# Patient Record
Sex: FEMALE | Race: BLACK OR AFRICAN AMERICAN
[De-identification: names, ages, dates, MRNs, and addresses within clinical notes are randomized per-mention and may not be internally consistent; named-entity substitution may affect disease eponyms.]

---

## 2017-01-18 ENCOUNTER — HOSPITAL ENCOUNTER (INPATIENT)
Dept: HOSPITAL 74 - YASAS | Age: 52
LOS: 4 days | Discharge: HOME | DRG: 773 | End: 2017-01-22
Attending: INTERNAL MEDICINE | Admitting: INTERNAL MEDICINE
Payer: COMMERCIAL

## 2017-01-18 VITALS — BODY MASS INDEX: 27.6 KG/M2

## 2017-01-18 DIAGNOSIS — F17.210: ICD-10-CM

## 2017-01-18 DIAGNOSIS — F14.20: ICD-10-CM

## 2017-01-18 DIAGNOSIS — K59.01: ICD-10-CM

## 2017-01-18 DIAGNOSIS — F11.20: ICD-10-CM

## 2017-01-18 DIAGNOSIS — R82.90: ICD-10-CM

## 2017-01-18 DIAGNOSIS — Z91.5: ICD-10-CM

## 2017-01-18 DIAGNOSIS — Z87.42: ICD-10-CM

## 2017-01-18 DIAGNOSIS — F19.24: ICD-10-CM

## 2017-01-18 DIAGNOSIS — E78.5: ICD-10-CM

## 2017-01-18 DIAGNOSIS — I10: ICD-10-CM

## 2017-01-18 DIAGNOSIS — F10.230: Primary | ICD-10-CM

## 2017-01-18 DIAGNOSIS — M17.11: ICD-10-CM

## 2017-01-18 DIAGNOSIS — K21.9: ICD-10-CM

## 2017-01-18 DIAGNOSIS — F31.81: ICD-10-CM

## 2017-01-18 DIAGNOSIS — J45.20: ICD-10-CM

## 2017-01-18 PROCEDURE — HZ2ZZZZ DETOXIFICATION SERVICES FOR SUBSTANCE ABUSE TREATMENT: ICD-10-PCS | Performed by: INTERNAL MEDICINE

## 2017-01-18 RX ADMIN — Medication SCH MG: at 22:07

## 2017-01-18 RX ADMIN — ATORVASTATIN CALCIUM SCH MG: 10 TABLET, FILM COATED ORAL at 22:07

## 2017-01-18 RX ADMIN — CYCLOBENZAPRINE HYDROCHLORIDE PRN MG: 10 TABLET, FILM COATED ORAL at 22:07

## 2017-01-18 RX ADMIN — RANITIDINE SCH MG: 150 TABLET ORAL at 22:07

## 2017-01-19 LAB
ALBUMIN SERPL-MCNC: 4.2 G/DL (ref 3.4–5)
ALP SERPL-CCNC: 121 U/L (ref 45–117)
ALT SERPL-CCNC: 26 U/L (ref 12–78)
ANION GAP SERPL CALC-SCNC: 8 MMOL/L (ref 8–16)
APPEARANCE UR: CLEAR
AST SERPL-CCNC: 24 U/L (ref 15–37)
BILIRUB SERPL-MCNC: 0.5 MG/DL (ref 0.2–1)
BILIRUB UR STRIP.AUTO-MCNC: NEGATIVE MG/DL
CALCIUM SERPL-MCNC: 9.6 MG/DL (ref 8.5–10.1)
CO2 SERPL-SCNC: 28 MMOL/L (ref 21–32)
COLOR UR: YELLOW
CREAT SERPL-MCNC: 1.1 MG/DL (ref 0.55–1.02)
DEPRECATED RDW RBC AUTO: 16.4 % (ref 11.6–15.6)
GLUCOSE SERPL-MCNC: 96 MG/DL (ref 74–106)
HYALINE CASTS URNS QL MICRO: 41 /LPF
KETONES UR QL STRIP: NEGATIVE
LEUKOCYTE ESTERASE UR QL STRIP.AUTO: (no result)
MCH RBC QN AUTO: 26.2 PG (ref 25.7–33.7)
MCHC RBC AUTO-ENTMCNC: 32.3 G/DL (ref 32–36)
MCV RBC: 81 FL (ref 80–96)
MUCOUS THREADS URNS QL MICRO: (no result)
NITRITE UR QL STRIP: NEGATIVE
PH UR: 5 [PH] (ref 5–8)
PLATELET # BLD AUTO: 336 K/MM3 (ref 134–434)
PMV BLD: 8.9 FL (ref 7.5–11.1)
PROT SERPL-MCNC: 8.8 G/DL (ref 6.4–8.2)
PROT UR QL STRIP: NEGATIVE
PROT UR QL STRIP: NEGATIVE
RBC # BLD AUTO: <1 /HPF (ref 0–3)
RBC # UR STRIP: NEGATIVE /UL
SP GR UR: 1.02 (ref 1–1.03)
UROBILINOGEN UR STRIP-MCNC: (no result) E.U./DL (ref 0.2–1)
WBC # BLD AUTO: 7.1 K/MM3 (ref 4–10)
WBC # UR AUTO: 1 /HPF (ref 3–5)

## 2017-01-19 RX ADMIN — RANITIDINE SCH MG: 150 TABLET ORAL at 10:20

## 2017-01-19 RX ADMIN — ANALGESIC BALM SCH: 1.74; 4.06 OINTMENT TOPICAL at 22:23

## 2017-01-19 RX ADMIN — Medication SCH TAB: at 10:16

## 2017-01-19 RX ADMIN — CYCLOBENZAPRINE HYDROCHLORIDE PRN MG: 10 TABLET, FILM COATED ORAL at 22:23

## 2017-01-19 RX ADMIN — Medication SCH MG: at 22:24

## 2017-01-19 RX ADMIN — ANALGESIC BALM SCH APPLIC: 1.74; 4.06 OINTMENT TOPICAL at 10:19

## 2017-01-19 RX ADMIN — ACETAMINOPHEN PRN MG: 325 TABLET ORAL at 05:49

## 2017-01-19 RX ADMIN — RANITIDINE SCH MG: 150 TABLET ORAL at 22:24

## 2017-01-19 RX ADMIN — ATORVASTATIN CALCIUM SCH MG: 10 TABLET, FILM COATED ORAL at 22:23

## 2017-01-19 RX ADMIN — NICOTINE SCH: 14 PATCH, EXTENDED RELEASE TRANSDERMAL at 10:21

## 2017-01-19 RX ADMIN — NICOTINE POLACRILEX PRN MG: 2 GUM, CHEWING ORAL at 10:21

## 2017-01-19 RX ADMIN — ASPIRIN 81 MG SCH MG: 81 TABLET ORAL at 10:15

## 2017-01-19 NOTE — PN
S CIWA





- CIWA Score


Nausea/Vomiting: 3


Muscle Tremors: 3


Anxiety: 3


Agitation: 3


Paroxysmal Sweats: 3


Orientation: 0-Oriented


Tacttile Disturbances: 2-Mild Itch/Numbness/Burn


Auditory Disturbances: 0-None


Visual Disturbances: 0-None


Headache: 0-None Present


CIWA-Ar Total Score: 17





BHS Progress Note (SOAP)


Subjective: 


interrupted sleep, sweats , rt knee arthritis, 


Objective: 





01/19/17 11:01


 Vital Signs











Temperature  98.4 F   01/19/17 10:06


 


Pulse Rate  105 H  01/19/17 10:06


 


Respiratory Rate  20   01/19/17 10:06


 


Blood Pressure  121/67   01/19/17 10:06


 


O2 Sat by Pulse Oximetry (%)      








 Laboratory Tests











  01/19/17 01/19/17





  05:45 05:45


 


WBC  7.1  D 


 


RBC  4.26 


 


Hgb  11.1 


 


Hct  34.5 


 


MCV  81.0 


 


MCHC  32.3 


 


RDW  16.4 H 


 


Plt Count  336  D 


 


MPV  8.9 


 


Sodium   140


 


Potassium   3.8


 


Chloride   104


 


Carbon Dioxide   28


 


Anion Gap   8


 


BUN   20 H D


 


Creatinine   1.1 H


 


Creat Clearance w eGFR   52.16


 


Random Glucose   96


 


Calcium   9.6


 


Total Bilirubin   0.5  D


 


AST   24  D


 


ALT   26


 


Alkaline Phosphatase   121 H D


 


Total Protein   8.8 H D


 


Albumin   4.2








pt aox3 ambulating with a cane 


rt knee arthritis 


Assessment: 





01/19/17 11:02


withdrawl sx's 


rt knee arthritis 


Plan: 


cont. detox 


increase fluids


analgesic balm

## 2017-01-19 NOTE — CONSULT
[facility] Psychiatric Consult





- Data


Date of interview: 01/19/17


Admission source: [facility]


Identifying data: This is 51 years old female, ambulating with cane,  with 

psychiatric hospitalization history iontoxicated with :  Alcohol, Cocaine, 

Xanax and Nicotine


Substance Abuse History: - Smoking Cessation.  Smoking history: Current every 

day smoker.  Have you smoked in the past 12 months: Yes.  Aproximately how many 

cigarettes per day: 10.  Cigars Per Day: 0.  Hx Chewing Tobacco Use: No.  

Initiated information on smoking cessation: Yes.  'Breaking Loose' booklet given

: 01/18/17.  - Substance & Tx. History.  Hx Alcohol Use: Yes.  Hx Substance Use

: Yes.  Substance Use Type: Alcohol, Cocaine, Heroin.  - Substances Abused.  ** 

Alcohol.  Route: Oral.  Frequency: Daily.  Amount used: VODKA- I FULL BOTTLE.  

Age of first use: 48.  Date of Last Use: 01/18/17.  ** Cocaine.  Route: 

Smoking.  Frequency: 1-3 times last 30 days.  Amount used: 3 BAGS- $30.  Age of 

first use: 18.  Date of Last Use: 01/18/17


Medical History: HTN, GERD, Asthma, Hyperlipidemia, Hyper glycemia, Renal 

Insuuficiency, Cellulitis history, right leg pain history


Psychiatric History: Patient reports history of Bipolar disorder with most 

rtecent psychiatric hospitalization on about 8 years ago, currently stable on:  

Seroquel 400mg po bid


Physical/Sexual Abuse/Trauma History: Denies


Additional Comment: Seroquel 400mg po bid





Mental Status Exam





- Mental Status Exam


Alert and Oriented to: Person


Cognitive Function: Fair


Patient Appearance: Unkempt


Mood: Sad


Affect: Mood Congruent


Patient Behavior: Cooperative


Speech Pattern: Appropriate


Voice Loudness: Mildly Loud


Thought Process: Goal Oriented


Thought Disorder: Being Controlled


Hallucinations: Denies


Suicidal Ideation: Denies


Homicidal Ideation: Denies


Insight/Judgement: Fair


Sleep: Difficulty falling asleep


Appetite: Weight gain


Muscle strength/Tone: Mild Hypotonicity


Gait/Station: Ataxic


Additional Comments: Seroquel 400mg po bid





Psychiatric Findings





- Problem List (Axis 1, 2,3)


(1) Alcohol dependence with uncomplicated withdrawal


Current Visit: Yes   Status: Acute





(2) Methadone maintenance therapy patient


Current Visit: Yes   Status: Acute


Comment: METHADONE 170 MG PO DAILY WAIT FOR VERIFICATION








(3) Nicotine dependence


Current Visit: Yes   Status: Acute   Qualifiers: 


     Nicotine product type: cigarettes     Substance use status: uncomplicated 

    Qualified Code(s): F17.210 - Nicotine dependence, cigarettes, uncomplicated

  





(4) Bipolar II disorder


Current Visit: Yes   Status: Suspected





(5) Sedative hypnotic or anxiolytic dependence


Current Visit: No   Status: Acute





(6) Cocaine dependence


Current Visit: No   Status: Chronic   Qualifiers: 


     Substance use status: uncomplicated     Qualified Code(s): F14.20 - 

Cocaine dependence, uncomplicated  





(7) Swelling of knee joint, right


Current Visit: No   Status: Chronic


Comment: cane to ambulate








(8) Drug-induced mood disorder


Current Visit: Yes   Status: Acute








- Initial Treatment Plan


Initial Treatment Plan: Seroquel 200mg po bid

## 2017-01-20 RX ADMIN — ANALGESIC BALM SCH APPLIC: 1.74; 4.06 OINTMENT TOPICAL at 22:10

## 2017-01-20 RX ADMIN — ATORVASTATIN CALCIUM SCH MG: 10 TABLET, FILM COATED ORAL at 22:11

## 2017-01-20 RX ADMIN — RANITIDINE SCH MG: 150 TABLET ORAL at 11:47

## 2017-01-20 RX ADMIN — ASPIRIN 81 MG SCH MG: 81 TABLET ORAL at 09:07

## 2017-01-20 RX ADMIN — QUETIAPINE FUMARATE SCH MG: 100 TABLET ORAL at 22:11

## 2017-01-20 RX ADMIN — Medication SCH MG: at 22:11

## 2017-01-20 RX ADMIN — Medication SCH TAB: at 09:07

## 2017-01-20 RX ADMIN — METHADONE HYDROCHLORIDE SCH MG: 40 TABLET ORAL at 09:04

## 2017-01-20 RX ADMIN — ANALGESIC BALM SCH APPLIC: 1.74; 4.06 OINTMENT TOPICAL at 09:10

## 2017-01-20 RX ADMIN — NICOTINE SCH: 14 PATCH, EXTENDED RELEASE TRANSDERMAL at 09:10

## 2017-01-20 RX ADMIN — RANITIDINE SCH MG: 150 TABLET ORAL at 22:11

## 2017-01-20 NOTE — PN
Psychiatric Progress Note


Vital Signs: 


 Vital Signs











 Period  Temp  Pulse  Resp  BP Sys/Trejo  Pulse Ox


 


 Last 24 Hr  95.2 F-99.9 F    16-20  105-120/71-86  











Date of Session: 01/20/17


Chief Complaint:: Tim drowsy after taking Seroquel and Methadone.


HPI: Patient addressed Alcohol,Cocaine and Anxiolytic dependence comorbid with 

Substance induced mood disorder.


ROS: Multiple medical proplems.See medical history.


Current Medications: 


Active Medications











Generic Name Dose Route Start Last Admin





  Trade Name Freq  PRN Reason Stop Dose Admin


 


Acetaminophen  650 mg 01/18/17 17:01 01/19/17 05:49





  Tylenol -  PO   650 mg





  Q4H PRN   Administration





  FEVER OR PAIN   


 


Al Hydroxide/Mg Hydroxide  30 ml 01/18/17 17:01  





  Mylanta Oral Suspension -  PO   





  Q6H PRN   





  DYSPEPSIA   


 


Aspirin  81 mg 01/19/17 10:00 01/20/17 09:07





  Asa -  PO   81 mg





  DAILY ELLE   Administration


 


Atorvastatin Calcium  10 mg 01/18/17 22:00 01/19/17 22:23





  Lipitor -  PO   10 mg





  HS ELLE   Administration


 


Chlordiazepoxide HCl  10 mg 01/21/17 23:00  





  Librium -  PO 01/22/17 17:01  





  [id number] ELLE   


 


Chlordiazepoxide HCl  25 mg 01/18/17 17:01  





  Librium -  PO 01/21/17 17:00  





  Q4H PRN   





  WITHDRAWAL(CONT SUBST)   


 


Chlordiazepoxide HCl  25 mg 01/19/17 23:00 01/20/17 11:22





  Librium -  PO 01/20/17 17:01  Not Given





  [id number] ELLE   


 


Chlordiazepoxide HCl  15 mg 01/20/17 23:00  





  Librium -  PO 01/21/17 17:01  





  [id number] ELLE   


 


Cyclobenzaprine HCl  10 mg 01/18/17 17:04 01/19/17 22:23





  Flexeril -  PO   10 mg





  TID PRN   Administration





  MUSCLE SPASMS   


 


Diphenhydramine HCl  50 mg 01/18/17 17:01  





  Benadryl -  PO   





  HSMR1 PRN   





  INSOMNIA   


 


Eucalyptus/Menthol/Phenol/Sorbitol  1 each 01/18/17 17:01  





  Cepastat Lozenge -  MM   





  Q4H PRN   





  SORE THROAT   


 


Guaifenesin  10 ml 01/18/17 17:01  





  Robitussin Dm -  PO   





  Q6H PRN   





  COUGH   


 


Loperamide HCl  4 mg 01/18/17 17:01  





  Imodium -  PO   





  Q6H PRN   





  DIARRHEA   


 


Magnesium Citrate  300 ml 01/18/17 17:01  





  Citroma -  PO   





  Q48H PRN   





  CONSTIPATION   


 


Magnesium Hydroxide  30 ml 01/18/17 17:01  





  Milk Of Magnesia -  PO   





  DAILY PRN   





  CONSTIPATION   


 


Methadone HCl 160 mg/  170 mg 01/20/17 06:00 01/20/17 09:04





  Methadone HCl 10 mg  PO 01/26/17 05:59  170 mg





  DAILY@0600 ELLE   Administration


 


Methyl Salicylate  1 applic 01/19/17 10:00 01/20/17 09:10





  Mian-Jolley -  TP   1 applic





  BID ELLE   Administration


 


Nicotine  14 mg 01/19/17 10:00 01/20/17 09:10





  Nicoderm Patch -  TD   Not Given





  DAILY ELLE   


 


Nicotine Polacrilex  2 mg 01/18/17 17:01 01/19/17 10:21





  Nicorette Gum -  BC   2 mg





  Q2H PRN   Administration





  NICOTINE REPLACEMENT RX   


 


Prenatal Multivit/Folic Acid/Iron  1 tab 01/19/17 10:00 01/20/17 09:07





  Prenatal Vitamins (Sjr) -  PO   1 tab





  DAILY ELLE   Administration


 


Pseudoephedrine/Triprolidine  1 combo 01/18/17 17:01  





  Actifed -  PO   





  TID PRN   





  NASAL CONGESTION   


 


Quetiapine Fumarate  100 mg 01/20/17 22:00  





  Seroquel -  PO   





  BID ELLE   


 


Ranitidine HCl  150 mg 01/18/17 22:00 01/19/17 22:24





  Zantac -  PO   150 mg





  BID ELLE   Administration


 


Thiamine HCl  100 mg 01/18/17 22:00 01/19/17 22:24





  Vitamin B1 -  PO   100 mg





  HS ELLE   Administration











Current Side Effect: No


Lab tests ordered: No


Lab tests reviewed: Yes


Provider note:: Patient was reevaluated today.According to the Nursing staff 

and the patient she was sedated and drowsy  since last night(Seroquel 200 mg po 

bid and Methadone 170 mg po daily was ordered yesterday).She is alert and 

oriented now.Properties of Methadone and Seroquel has been discussed with the 

patient including side effects ,benefits and dose adjustement.  Seroquel 200 mg 

po bid will be adjusted to 100 mg po bid.  Will monitor progress.





Mental Status Exam





- Mental Status Exam


Alert and Oriented to: Time, Place, Person


Cognitive Function: Grossly Intact


Patient Appearance: Unkempt


Mood: Euthymic


Affect: Mood Congruent


Patient Behavior: Sedated


Speech Pattern: Clear


Voice Loudness: Normal


Thought Process: Goal Oriented


Thought Disorder: Not Present


Hallucinations: Denies


Suicidal Ideation: Denies


Homicidal Ideation: Denies


Insight/Judgement: Fair


Sleep: Fair


Appetite: Fair


Muscle strength/Tone: Normal


Gait/Station: Normal





Psychiatric Treatment Plan





- Problem List


(1) Alcohol dependence with uncomplicated withdrawal


Current Visit: Yes





(2) Asthma


Current Visit: Yes   Qualifiers: 


     Asthma severity: mild intermittent     Asthma complication type: 

uncomplicated        Qualified Code(s): J45.20 - Mild intermittent asthma, 

uncomplicated  


Comment: ALBUTEROL


STRONG RECOMMEND STOP SMOKE CIGARETTE








(3) Drug-induced mood disorder


Current Visit: Yes





(4) GERD (gastroesophageal reflux disease)


Current Visit: Yes   Qualifiers: 


     Esophagitis presence: without esophagitis        Qualified Code(s): K21.9 

- Gastro-esophageal reflux disease without esophagitis  





(5) HTN (hypertension)


Current Visit: Yes   Qualifiers: 


     Hypertension type: essential hypertension     Qualified Code(s): I10 - 

Essential (primary) hypertension  





(6) Hyperlipidemia


Current Visit: Yes   Qualifiers: 


     Hyperlipidemia type: pure hypercholesterolemia        Qualified Code(s): 

E78.0 - Pure hypercholesterolemia  





(7) Methadone maintenance therapy patient


Current Visit: Yes


Comment: METHADONE 170 MG PO DAILY WAIT FOR VERIFICATION

## 2017-01-20 NOTE — PN
S CIWA





- CIWA Score


Nausea/Vomitin-No Nausea/No Vomiting


Muscle Tremors: 4-Moderate,w/Arms Extend


Anxiety: 3


Agitation: 4-Moderately Restless


Paroxysmal Sweats: 3


Orientation: 0-Oriented


Tacttile Disturbances: 0-None


Auditory Disturbances: 0-None


Visual Disturbances: 0-None


Headache: 0-None Present


CIWA-Ar Total Score: 14





BHS Progress Note (SOAP)


Subjective: 


body aches


sweats


interrupted sleep


Objective: 





17 11:37


 Vital Signs











Temperature  95.2 F L  17 10:00


 


Pulse Rate  106 H  17 10:00


 


Respiratory Rate  20   17 10:00


 


Blood Pressure  105/71   17 10:00


 


O2 Sat by Pulse Oximetry (%)      








 Laboratory Tests











  17





  05:45 05:45 05:45


 


WBC  7.1  D  


 


RBC  4.26  


 


Hgb  11.1  


 


Hct  34.5  


 


MCV  81.0  


 


MCHC  32.3  


 


RDW  16.4 H  


 


Plt Count  336  D  


 


MPV  8.9  


 


Sodium   140 


 


Potassium   3.8 


 


Chloride   104 


 


Carbon Dioxide   28 


 


Anion Gap   8 


 


BUN   20 H D 


 


Creatinine   1.1 H 


 


Creat Clearance w eGFR   52.16 


 


Random Glucose   96 


 


Calcium   9.6 


 


Total Bilirubin   0.5  D 


 


AST   24  D 


 


ALT   26 


 


Alkaline Phosphatase   121 H D 


 


Total Protein   8.8 H D 


 


Albumin   4.2 


 


Urine Color   


 


Urine Appearance   


 


Urine pH   


 


Ur Specific Gravity   


 


Urine Protein   


 


Urine Glucose (UA)   


 


Urine Ketones   


 


Urine Blood   


 


Urine Nitrite   


 


Urine Bilirubin   


 


Urine Urobilinogen   


 


Ur Leukocyte Esterase   


 


Urine RBC   


 


Urine WBC   


 


Ur Epithelial Cells   


 


Hyaline Casts   


 


Urine Mucus   


 


RPR Titer    Nonreactive














  17





  11:15


 


WBC 


 


RBC 


 


Hgb 


 


Hct 


 


MCV 


 


MCHC 


 


RDW 


 


Plt Count 


 


MPV 


 


Sodium 


 


Potassium 


 


Chloride 


 


Carbon Dioxide 


 


Anion Gap 


 


BUN 


 


Creatinine 


 


Creat Clearance w eGFR 


 


Random Glucose 


 


Calcium 


 


Total Bilirubin 


 


AST 


 


ALT 


 


Alkaline Phosphatase 


 


Total Protein 


 


Albumin 


 


Urine Color  Yellow


 


Urine Appearance  Clear


 


Urine pH  5.0


 


Ur Specific Gravity  1.020


 


Urine Protein  Negative


 


Urine Glucose (UA)  Negative


 


Urine Ketones  Negative


 


Urine Blood  Negative


 


Urine Nitrite  Negative


 


Urine Bilirubin  Negative


 


Urine Urobilinogen  4.0 e.u/dl H


 


Ur Leukocyte Esterase  2+ H


 


Urine RBC  <1


 


Urine WBC  1


 


Ur Epithelial Cells  Rare


 


Hyaline Casts  41


 


Urine Mucus  Rare


 


RPR Titer 








awake/alert


ambulating


no acute distress


Assessment: 





17 11:37


withdrawal sx


Plan: 


continue detox


increase fluids


motrin/tylenol prn

## 2017-01-21 RX ADMIN — RANITIDINE SCH MG: 150 TABLET ORAL at 10:43

## 2017-01-21 RX ADMIN — Medication SCH MG: at 22:24

## 2017-01-21 RX ADMIN — NICOTINE POLACRILEX PRN MG: 2 GUM, CHEWING ORAL at 10:45

## 2017-01-21 RX ADMIN — NICOTINE SCH MG: 14 PATCH, EXTENDED RELEASE TRANSDERMAL at 10:45

## 2017-01-21 RX ADMIN — ANALGESIC BALM SCH APPLIC: 1.74; 4.06 OINTMENT TOPICAL at 22:26

## 2017-01-21 RX ADMIN — ATORVASTATIN CALCIUM SCH MG: 10 TABLET, FILM COATED ORAL at 22:24

## 2017-01-21 RX ADMIN — ACETAMINOPHEN PRN MG: 325 TABLET ORAL at 17:46

## 2017-01-21 RX ADMIN — METHADONE HYDROCHLORIDE SCH MG: 40 TABLET ORAL at 06:02

## 2017-01-21 RX ADMIN — ANALGESIC BALM SCH APPLIC: 1.74; 4.06 OINTMENT TOPICAL at 10:43

## 2017-01-21 RX ADMIN — Medication SCH TAB: at 10:42

## 2017-01-21 RX ADMIN — BACITRACIN ZINC SCH: 500 OINTMENT TOPICAL at 14:28

## 2017-01-21 RX ADMIN — QUETIAPINE FUMARATE SCH MG: 100 TABLET ORAL at 22:24

## 2017-01-21 RX ADMIN — QUETIAPINE FUMARATE SCH MG: 100 TABLET ORAL at 10:42

## 2017-01-21 RX ADMIN — ASPIRIN 81 MG SCH MG: 81 TABLET ORAL at 10:43

## 2017-01-21 RX ADMIN — BACITRACIN ZINC SCH APPLIC: 500 OINTMENT TOPICAL at 22:26

## 2017-01-21 RX ADMIN — CYCLOBENZAPRINE HYDROCHLORIDE PRN MG: 10 TABLET, FILM COATED ORAL at 22:24

## 2017-01-21 RX ADMIN — ACETAMINOPHEN PRN MG: 325 TABLET ORAL at 10:47

## 2017-01-21 RX ADMIN — RANITIDINE SCH MG: 150 TABLET ORAL at 22:24

## 2017-01-21 RX ADMIN — CYCLOBENZAPRINE HYDROCHLORIDE PRN MG: 10 TABLET, FILM COATED ORAL at 10:47

## 2017-01-21 NOTE — PN
[facility]HS Progress Note (SOAP)


Subjective: 


nausea, sweats, interrupted sleep, need asthma pump, bacitracin for cuts on hand


Objective: 





01/21/17 12:45


 Vital Signs - 8 hr











  01/21/17 01/21/17





  06:31 10:04


 


Temperature 98 F 99 F


 


Pulse Rate 91 H 107 H


 


Respiratory 20 16





Rate  


 


Blood Pressure 111/65 109/89








 Laboratory Tests











  01/19/17 01/19/17 01/19/17





  05:45 05:45 05:45


 


WBC  7.1  D  


 


RBC  4.26  


 


Hgb  11.1  


 


Hct  34.5  


 


MCV  81.0  


 


MCHC  32.3  


 


RDW  16.4 H  


 


Plt Count  336  D  


 


MPV  8.9  


 


Sodium   140 


 


Potassium   3.8 


 


Chloride   104 


 


Carbon Dioxide   28 


 


Anion Gap   8 


 


BUN   20 H D 


 


Creatinine   1.1 H 


 


Creat Clearance w eGFR   52.16 


 


Random Glucose   96 


 


Calcium   9.6 


 


Total Bilirubin   0.5  D 


 


AST   24  D 


 


ALT   26 


 


Alkaline Phosphatase   121 H D 


 


Total Protein   8.8 H D 


 


Albumin   4.2 


 


Urine Color   


 


Urine Appearance   


 


Urine pH   


 


Ur Specific Gravity   


 


Urine Protein   


 


Urine Glucose (UA)   


 


Urine Ketones   


 


Urine Blood   


 


Urine Nitrite   


 


Urine Bilirubin   


 


Urine Urobilinogen   


 


Ur Leukocyte Esterase   


 


Urine RBC   


 


Urine WBC   


 


Ur Epithelial Cells   


 


Hyaline Casts   


 


Urine Mucus   


 


RPR Titer    Nonreactive














  01/19/17





  11:15


 


WBC 


 


RBC 


 


Hgb 


 


Hct 


 


MCV 


 


MCHC 


 


RDW 


 


Plt Count 


 


MPV 


 


Sodium 


 


Potassium 


 


Chloride 


 


Carbon Dioxide 


 


Anion Gap 


 


BUN 


 


Creatinine 


 


Creat Clearance w eGFR 


 


Random Glucose 


 


Calcium 


 


Total Bilirubin 


 


AST 


 


ALT 


 


Alkaline Phosphatase 


 


Total Protein 


 


Albumin 


 


Urine Color  Yellow


 


Urine Appearance  Clear


 


Urine pH  5.0


 


Ur Specific Gravity  1.020


 


Urine Protein  Negative


 


Urine Glucose (UA)  Negative


 


Urine Ketones  Negative


 


Urine Blood  Negative


 


Urine Nitrite  Negative


 


Urine Bilirubin  Negative


 


Urine Urobilinogen  4.0 e.u/dl H


 


Ur Leukocyte Esterase  2+ H


 


Urine RBC  <1


 


Urine WBC  1


 


Ur Epithelial Cells  Rare


 


Hyaline Casts  41


 


Urine Mucus  Rare


 


RPR Titer 








abnormal u/a


Assessment: 





01/21/17 12:45


withdrawal sx, r/o uti needs repeat u/a, athma pump and topical bacitracin for 

cuts


Plan: 


cont detox

## 2017-01-22 VITALS — TEMPERATURE: 98.2 F | SYSTOLIC BLOOD PRESSURE: 117 MMHG | HEART RATE: 97 BPM | DIASTOLIC BLOOD PRESSURE: 77 MMHG

## 2017-01-22 LAB
APPEARANCE UR: CLEAR
BILIRUB UR STRIP.AUTO-MCNC: NEGATIVE MG/DL
COLOR UR: (no result)
KETONES UR QL STRIP: NEGATIVE
LEUKOCYTE ESTERASE UR QL STRIP.AUTO: (no result)
NITRITE UR QL STRIP: NEGATIVE
PH UR: 5 [PH] (ref 5–8)
PROT UR QL STRIP: NEGATIVE
PROT UR QL STRIP: NEGATIVE
RBC # BLD AUTO: (no result) /HPF (ref 0–3)
RBC # UR STRIP: NEGATIVE /UL
SP GR UR: 1.02 (ref 1–1.03)
UROBILINOGEN UR STRIP-MCNC: NEGATIVE E.U./DL (ref 0.2–1)
WBC # UR AUTO: 1 /HPF (ref 3–5)

## 2017-01-22 RX ADMIN — METHADONE HYDROCHLORIDE SCH MG: 40 TABLET ORAL at 06:05

## 2017-01-22 NOTE — PN
[facility]HS Progress Note (SOAP)


Subjective: 


no complaints


Objective: 





01/22/17 08:52


 Vital Signs - 8 hr











  01/22/17 01/22/17





  03:30 06:00


 


Temperature  98.4 F


 


Pulse Rate  91 H


 


Respiratory 18 18





Rate  


 


Blood Pressure  124/67








 Laboratory Tests











  01/19/17 01/19/17 01/19/17





  05:45 05:45 05:45


 


WBC  7.1  D  


 


RBC  4.26  


 


Hgb  11.1  


 


Hct  34.5  


 


MCV  81.0  


 


MCHC  32.3  


 


RDW  16.4 H  


 


Plt Count  336  D  


 


MPV  8.9  


 


Sodium   140 


 


Potassium   3.8 


 


Chloride   104 


 


Carbon Dioxide   28 


 


Anion Gap   8 


 


BUN   20 H D 


 


Creatinine   1.1 H 


 


Creat Clearance w eGFR   52.16 


 


Random Glucose   96 


 


Calcium   9.6 


 


Total Bilirubin   0.5  D 


 


AST   24  D 


 


ALT   26 


 


Alkaline Phosphatase   121 H D 


 


Total Protein   8.8 H D 


 


Albumin   4.2 


 


Urine Color   


 


Urine Appearance   


 


Urine pH   


 


Ur Specific Gravity   


 


Urine Protein   


 


Urine Glucose (UA)   


 


Urine Ketones   


 


Urine Blood   


 


Urine Nitrite   


 


Urine Bilirubin   


 


Urine Urobilinogen   


 


Ur Leukocyte Esterase   


 


Urine RBC   


 


Urine WBC   


 


Ur Epithelial Cells   


 


Hyaline Casts   


 


Urine Mucus   


 


RPR Titer    Nonreactive














  01/19/17





  11:15


 


WBC 


 


RBC 


 


Hgb 


 


Hct 


 


MCV 


 


MCHC 


 


RDW 


 


Plt Count 


 


MPV 


 


Sodium 


 


Potassium 


 


Chloride 


 


Carbon Dioxide 


 


Anion Gap 


 


BUN 


 


Creatinine 


 


Creat Clearance w eGFR 


 


Random Glucose 


 


Calcium 


 


Total Bilirubin 


 


AST 


 


ALT 


 


Alkaline Phosphatase 


 


Total Protein 


 


Albumin 


 


Urine Color  Yellow


 


Urine Appearance  Clear


 


Urine pH  5.0


 


Ur Specific Gravity  1.020


 


Urine Protein  Negative


 


Urine Glucose (UA)  Negative


 


Urine Ketones  Negative


 


Urine Blood  Negative


 


Urine Nitrite  Negative


 


Urine Bilirubin  Negative


 


Urine Urobilinogen  4.0 e.u/dl H


 


Ur Leukocyte Esterase  2+ H


 


Urine RBC  <1


 


Urine WBC  1


 


Ur Epithelial Cells  Rare


 


Hyaline Casts  41


 


Urine Mucus  Rare


 


RPR Titer 











Assessment: 





01/22/17 08:53


completed detox, medically stable, repeat u/a sent results pending


Plan: 


medically stable, d/c home today f/u PCP for abnormal lab results encourage 

fluids.

## 2017-01-22 NOTE — DS
[facility] Detox Discharge Summary


Admission Date: 


01/18/17





Discharge Date: 01/22/17





- History


Present History: Alcohol Dependence, MMTP


Pertinent Past History: 


asthma, constipation, anxiety, depression and insomnia, GERD





- Physical Exam Results


Vital Signs: 


 Vital Signs











Temperature  98.4 F   01/22/17 06:00


 


Pulse Rate  91 H  01/22/17 06:00


 


Respiratory Rate  18   01/22/17 06:00


 


Blood Pressure  124/67   01/22/17 06:00


 


O2 Sat by Pulse Oximetry (%)      











Pertinent Admission Physical Exam Findings: 


withdrawal sx





- Treatment


Hospital Course: Detox Protocol Followed, Detoxed Safely, Responded well, 

Discharged Condition Good, Rehab Referral Accepted


Patient has Accepted a Rehab Referral to: Yes





- Medication


Discharge Medications: 


Ambulatory Orders





Quetiapine Fumarate [Seroquel -] 400 mg PO BID 05/25/16 


Hydroxyzine Pamoate [Vistaril -] 100 mg PO BID #60 capsule 08/10/16 


Aspirin [ASA -] 81 mg PO DAILY #30 tab.chew 08/13/16 


Pravastatin Sodium [Pravachol -] 20 mg PO HS #30 tablet 08/13/16 


Baclofen [Lioresal -] 10 mg PO BID 01/18/17 


Clonidine HCl 0.3 mg PO DAILY 01/18/17 


Omeprazole 20 mg PO DAILY 01/18/17 


Quetiapine Fumarate [Seroquel -] 200 mg PO BID #60 tab 01/19/17 











- Diagnosis


(1) Alcohol dependence with uncomplicated withdrawal


Current Visit: Yes   Status: Acute





(2) Asthma


Current Visit: Yes   Status: Acute   Qualifiers: 


     Asthma severity: mild intermittent     Asthma complication type: 

uncomplicated        Qualified Code(s): J45.20 - Mild intermittent asthma, 

uncomplicated  





(3) Constipation


Current Visit: Yes   Status: Acute   Qualifiers: 


     Constipation type: slow transit constipation        Qualified Code(s): 

K59.01 - Slow transit constipation  





(4) Drug-induced mood disorder


Current Visit: Yes   Status: Acute





(5) GERD (gastroesophageal reflux disease)


Current Visit: Yes   Status: Acute   Qualifiers: 


     Esophagitis presence: without esophagitis        Qualified Code(s): K21.9 

- Gastro-esophageal reflux disease without esophagitis  





(6) HTN (hypertension)


Current Visit: Yes   Status: Acute   Qualifiers: 


     Hypertension type: essential hypertension     Qualified Code(s): I10 - 

Essential (primary) hypertension  





(7) Hyperlipidemia


Current Visit: Yes   Status: Acute   Qualifiers: 


     Hyperlipidemia type: pure hypercholesterolemia        Qualified Code(s): 

E78.0 - Pure hypercholesterolemia  





(8) Methadone maintenance therapy patient


Current Visit: Yes   Status: Acute

## 2017-08-08 ENCOUNTER — HOSPITAL ENCOUNTER (INPATIENT)
Dept: HOSPITAL 74 - YASAS | Age: 52
LOS: 5 days | Discharge: HOME | DRG: 773 | End: 2017-08-13
Attending: INTERNAL MEDICINE | Admitting: INTERNAL MEDICINE
Payer: COMMERCIAL

## 2017-08-08 VITALS — BODY MASS INDEX: 32.5 KG/M2

## 2017-08-08 DIAGNOSIS — J45.20: ICD-10-CM

## 2017-08-08 DIAGNOSIS — B18.2: ICD-10-CM

## 2017-08-08 DIAGNOSIS — R26.2: ICD-10-CM

## 2017-08-08 DIAGNOSIS — F10.230: Primary | ICD-10-CM

## 2017-08-08 DIAGNOSIS — Z87.42: ICD-10-CM

## 2017-08-08 DIAGNOSIS — K21.9: ICD-10-CM

## 2017-08-08 DIAGNOSIS — M79.604: ICD-10-CM

## 2017-08-08 DIAGNOSIS — F19.24: ICD-10-CM

## 2017-08-08 DIAGNOSIS — F17.210: ICD-10-CM

## 2017-08-08 DIAGNOSIS — I10: ICD-10-CM

## 2017-08-08 DIAGNOSIS — E78.5: ICD-10-CM

## 2017-08-08 DIAGNOSIS — Z99.89: ICD-10-CM

## 2017-08-08 DIAGNOSIS — M25.461: ICD-10-CM

## 2017-08-08 DIAGNOSIS — F31.81: ICD-10-CM

## 2017-08-08 DIAGNOSIS — E66.9: ICD-10-CM

## 2017-08-08 DIAGNOSIS — F11.20: ICD-10-CM

## 2017-08-08 DIAGNOSIS — F14.20: ICD-10-CM

## 2017-08-08 DIAGNOSIS — Z91.5: ICD-10-CM

## 2017-08-08 DIAGNOSIS — J44.9: ICD-10-CM

## 2017-08-08 LAB
APPEARANCE UR: (no result)
BILIRUB UR STRIP.AUTO-MCNC: NEGATIVE MG/DL
COLOR UR: YELLOW
KETONES UR QL STRIP: NEGATIVE
LEUKOCYTE ESTERASE UR QL STRIP.AUTO: (no result)
NITRITE UR QL STRIP: NEGATIVE
PH UR: 5 [PH] (ref 5–8)
PROT UR QL STRIP: NEGATIVE
PROT UR QL STRIP: NEGATIVE
RBC # UR STRIP: NEGATIVE /UL
UROBILINOGEN UR STRIP-MCNC: NEGATIVE MG/DL (ref 0.2–1)

## 2017-08-08 PROCEDURE — HZ2ZZZZ DETOXIFICATION SERVICES FOR SUBSTANCE ABUSE TREATMENT: ICD-10-PCS | Performed by: INTERNAL MEDICINE

## 2017-08-08 RX ADMIN — ATORVASTATIN CALCIUM SCH MG: 10 TABLET, FILM COATED ORAL at 22:22

## 2017-08-08 RX ADMIN — Medication SCH MG: at 22:23
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
## 2017-08-09 LAB
ALBUMIN SERPL-MCNC: 3 G/DL (ref 3.4–5)
ALP SERPL-CCNC: 89 U/L (ref 45–117)
ALT SERPL-CCNC: 35 U/L (ref 12–78)
ANION GAP SERPL CALC-SCNC: 7 MMOL/L (ref 8–16)
AST SERPL-CCNC: 34 U/L (ref 15–37)
BILIRUB SERPL-MCNC: 0.2 MG/DL (ref 0.2–1)
CALCIUM SERPL-MCNC: 9.4 MG/DL (ref 8.5–10.1)
CO2 SERPL-SCNC: 28 MMOL/L (ref 21–32)
CREAT SERPL-MCNC: 0.8 MG/DL (ref 0.55–1.02)
DEPRECATED RDW RBC AUTO: 16.3 % (ref 11.6–15.6)
GLUCOSE SERPL-MCNC: 66 MG/DL (ref 74–106)
HYALINE CASTS URNS QL MICRO: 1 /LPF
MCH RBC QN AUTO: 27.6 PG (ref 25.7–33.7)
MCHC RBC AUTO-ENTMCNC: 31.7 G/DL (ref 32–36)
MCV RBC: 87 FL (ref 80–96)
MUCOUS THREADS URNS QL MICRO: (no result)
PLATELET # BLD AUTO: 197 K/MM3 (ref 134–434)
PMV BLD: 9.6 FL (ref 7.5–11.1)
PROT SERPL-MCNC: 6.5 G/DL (ref 6.4–8.2)
RBC # BLD AUTO: 1 /HPF (ref 0–3)
SP GR UR: 1.02 (ref 1–1.02)
WBC # BLD AUTO: 5.6 K/MM3 (ref 4–10)
WBC # UR AUTO: 7 /HPF (ref 3–5)

## 2017-08-09 RX ADMIN — PANTOPRAZOLE SODIUM SCH MG: 40 TABLET, DELAYED RELEASE ORAL at 10:34

## 2017-08-09 RX ADMIN — IBUPROFEN PRN MG: 400 TABLET, FILM COATED ORAL at 20:57

## 2017-08-09 RX ADMIN — ASPIRIN 81 MG SCH MG: 81 TABLET ORAL at 10:34

## 2017-08-09 RX ADMIN — IBUPROFEN PRN MG: 400 TABLET, FILM COATED ORAL at 12:38

## 2017-08-09 RX ADMIN — Medication SCH TAB: at 10:34

## 2017-08-09 RX ADMIN — QUETIAPINE SCH MG: 400 TABLET, FILM COATED ORAL at 22:18

## 2017-08-09 RX ADMIN — ATORVASTATIN CALCIUM SCH MG: 10 TABLET, FILM COATED ORAL at 22:17

## 2017-08-09 RX ADMIN — NICOTINE SCH MG: 21 PATCH TRANSDERMAL at 10:39

## 2017-08-09 RX ADMIN — Medication SCH MG: at 22:18

## 2017-08-09 NOTE — EKG
Test Reason : 

Blood Pressure : ***/*** mmHG

Vent. Rate : 078 BPM     Atrial Rate : 078 BPM

   P-R Int : 136 ms          QRS Dur : 076 ms

    QT Int : 378 ms       P-R-T Axes : 058 056 033 degrees

   QTc Int : 430 ms

 

NORMAL SINUS RHYTHM

NONSPECIFIC T WAVE ABNORMALITY

ABNORMAL ECG

WHEN COMPARED WITH ECG OF 18-JAN-2017 18:53,

QT HAS LENGTHENED

Confirmed by ONEL OSEI MD ([id number]) on 8/9/2017 2:50:37 PM

 

Referred By:             Confirmed By:ONEL OSEI MD

## 2017-08-09 NOTE — PN
S CIWA





- CIWA Score


Nausea/Vomitin-No Nausea/No Vomiting


Muscle Tremors: 4-Moderate,w/Arms Extend


Anxiety: 3


Agitation: 4-Moderately Restless


Paroxysmal Sweats: 3


Orientation: 0-Oriented


Tacttile Disturbances: 0-None


Auditory Disturbances: 0-None


Visual Disturbances: 0-None


Headache: 1-Very Mild


CIWA-Ar Total Score: 15





[facility] Progress Note (SOAP)


Subjective: 


sweats


shakes


interrupted sleep


agitation


Objective: 





17 10:16


 Vital Signs











Temperature  99 F   17 10:03


 


Pulse Rate  91 H  17 10:03


 


Respiratory Rate  18   17 10:03


 


Blood Pressure  162/85   17 10:03


 


O2 Sat by Pulse Oximetry (%)      








 Laboratory Tests











  17





  18:25 07:40


 


WBC   5.6


 


RBC   4.17


 


Hgb   11.5


 


Hct   36.3


 


MCV   87.0


 


MCH   27.6


 


MCHC   31.7 L


 


RDW   16.3 H


 


Plt Count   197  D


 


MPV   9.6


 


Urine Color  Yellow 


 


Urine Appearance  Slcloudy 


 


Urine pH  5.0 


 


Urine Protein  Negative 


 


Urine Glucose (UA)  Negative 


 


Urine Ketones  Negative 


 


Urine Blood  Negative 


 


Urine Nitrite  Negative 


 


Urine Bilirubin  Negative 


 


Urine Urobilinogen  Negative 


 


Ur Leukocyte Esterase  3+ H D 


 


Urine RBC  1 


 


Urine WBC  7 


 


Ur Epithelial Cells  Rare 


 


Hyaline Casts  1 


 


Urine Mucus  Rare 








rest of labs pending


awake/alert


ambulating


no acute distress


Assessment: 





17 10:19


withdrawal sx


Plan: 


continue detox


increase fluids


labs pending

## 2017-08-09 NOTE — CONSULT
[facility] Psychiatric Consult





- Data


Date of interview: 08/09/17


Admission source: [facility]


Identifying data: This is 51 years old female ambulating with Cane, with 

psychiatric hospitalization history intoxicated with: Alcohol, Cocaine, Nicotine


Substance Abuse History: Smoking history: Current every day smoker.  Have you 

smoked in the past 12 months: Yes.  Aproximately how many cigarettes per day: 

10.  Cigars Per Day: 0.  Hx Chewing Tobacco Use: No.  Initiated information on 

smoking cessation: Yes.  'Breaking Loose' booklet given: 08/08/17.  - Substance 

& Tx. History.  Hx Alcohol Use: Yes.  Hx Substance Use: Yes.  Substance Use Type

: Alcohol, Cocaine.  Hx Substance Use Treatment: Yes ([facility] 01/18/17 o 01/22/17)

.  - Substances Abused.  ** Alcohol.  Route: Oral.  Frequency: Daily.  Amount 

used: 1 to 2 pints of vodka.  Age of first use: 11.  Date of Last Use: 08/07/ 17.  ** Cocaine.  Route: Smoking.  Frequency: 1-2 times per week.  Amount used: 

50$.  Age of first use: 18.  Date of Last Use: 08/05/17


Medical History: GERDM Obesity, HTN, Ambulates with cane, HepC+, Cellulitis 

history


Psychiatric History: Patient reprots history of Bipolar Disorder, unclear 

psychiatric admissioin on more tnen 10 years ago, denies suicidal history, 

reports taking prior to admission:  Seroquel 400mg po bid


Physical/Sexual Abuse/Trauma History: Denies


Additional Comment: Seroquel 400mg po bid





Mental Status Exam





- Mental Status Exam


Alert and Oriented to: Person


Cognitive Function: Fair


Patient Appearance: Unkempt


Mood: Sad


Affect: Flat


Patient Behavior: Sedated, Cooperative


Speech Pattern: Delayed


Voice Loudness: Mildly Soft/Quiet


Thought Process: Circumstantial


Thought Disorder: Being Controlled


Hallucinations: Denies


Suicidal Ideation: Denies


Homicidal Ideation: Denies


Insight/Judgement: Fair


Sleep: Difficulty falling asleep


Appetite: Weight gain


Muscle strength/Tone: Mild Hypotonicity


Gait/Station: Deferred


Additional Comments: Seroquel 400mg po bid





Psychiatric Findings





- Problem List (Axis 1, 2,3)


(1) Alcohol dependence with uncomplicated withdrawal


Current Visit: No   Status: Acute





(2) Drug-induced mood disorder


Current Visit: No   Status: Acute





(3) Methadone maintenance therapy patient


Current Visit: No   Status: Acute


Comment: METHADONE 170 MG PO DAILY WAIT FOR VERIFICATION








(4) Sedative hypnotic or anxiolytic dependence


Current Visit: No   Status: Acute





(5) Cocaine dependence


Current Visit: No   Status: Chronic   Qualifiers: 


     Substance use status: uncomplicated     Qualified Code(s): F14.20 - 

Cocaine dependence, uncomplicated  





(6) Bipolar II disorder


Current Visit: No   Status: Suspected








- Initial Treatment Plan


Initial Treatment Plan: Seroquel 400mg po [facility]HS

## 2017-08-10 RX ADMIN — ANALGESIC BALM SCH APPLIC: 1.74; 4.06 OINTMENT TOPICAL at 22:08

## 2017-08-10 RX ADMIN — IBUPROFEN PRN MG: 400 TABLET, FILM COATED ORAL at 22:11

## 2017-08-10 RX ADMIN — Medication SCH MG: at 22:09

## 2017-08-10 RX ADMIN — METHADONE HYDROCHLORIDE SCH MG: 40 TABLET ORAL at 07:22

## 2017-08-10 RX ADMIN — ASPIRIN 81 MG SCH MG: 81 TABLET ORAL at 10:37

## 2017-08-10 RX ADMIN — QUETIAPINE SCH MG: 400 TABLET, FILM COATED ORAL at 22:09

## 2017-08-10 RX ADMIN — NICOTINE SCH MG: 21 PATCH TRANSDERMAL at 10:37

## 2017-08-10 RX ADMIN — PANTOPRAZOLE SODIUM SCH MG: 40 TABLET, DELAYED RELEASE ORAL at 10:38

## 2017-08-10 RX ADMIN — Medication SCH TAB: at 10:37

## 2017-08-10 RX ADMIN — NICOTINE POLACRILEX PRN MG: 2 GUM, CHEWING ORAL at 10:38

## 2017-08-10 RX ADMIN — IBUPROFEN PRN MG: 400 TABLET, FILM COATED ORAL at 05:40

## 2017-08-10 RX ADMIN — ANALGESIC BALM SCH APPLIC: 1.74; 4.06 OINTMENT TOPICAL at 11:02

## 2017-08-10 RX ADMIN — ATORVASTATIN CALCIUM SCH MG: 10 TABLET, FILM COATED ORAL at 22:09

## 2017-08-10 NOTE — PN
[facility] CIWA





- CIWA Score


Nausea/Vomitin-No Nausea/No Vomiting


Muscle Tremors: 3


Anxiety: 2


Agitation: 3


Paroxysmal Sweats: 3


Orientation: 0-Oriented


Tacttile Disturbances: 0-None


Auditory Disturbances: 0-None


Visual Disturbances: 0-None


Headache: 0-None Present


CIWA-Ar Total Score: 11





B[facility] Progress Note (SOAP)


Subjective: 


right knee pain


sweats


interrupted sleep


agitation


Objective: 





08/10/17 09:18


 Vital Signs











Temperature  97.7 F   08/10/17 06:23


 


Pulse Rate  73   08/10/17 06:23


 


Respiratory Rate  18   08/10/17 06:23


 


Blood Pressure  122/58   08/10/17 06:23


 


O2 Sat by Pulse Oximetry (%)      








 Laboratory Tests











  17





  18:25 07:40 07:40


 


WBC   5.6 


 


RBC   4.17 


 


Hgb   11.5 


 


Hct   36.3 


 


MCV   87.0 


 


MCH   27.6 


 


MCHC   31.7 L 


 


RDW   16.3 H 


 


Plt Count   197  D 


 


MPV   9.6 


 


Sodium    139


 


Potassium    4.0


 


Chloride    104


 


Carbon Dioxide    28


 


Anion Gap    7 L


 


BUN    15  D


 


Creatinine    0.8  D


 


Creat Clearance w eGFR    > 60


 


Random Glucose    66 L D


 


Calcium    9.4


 


Total Bilirubin    0.2  D


 


AST    34  D


 


ALT    35  D


 


Alkaline Phosphatase    89  D


 


Total Protein    6.5  D


 


Albumin    3.0 L D


 


Urine Color  Yellow  


 


Urine Appearance  Slcloudy  


 


Urine pH  5.0  


 


Ur Specific Gravity  1.025  


 


Urine Protein  Negative  


 


Urine Glucose (UA)  Negative  


 


Urine Ketones  Negative  


 


Urine Blood  Negative  


 


Urine Nitrite  Negative  


 


Urine Bilirubin  Negative  


 


Urine Urobilinogen  Negative  


 


Ur Leukocyte Esterase  3+ H D  


 


Urine RBC  1  


 


Urine WBC  7  


 


Ur Epithelial Cells  Rare  


 


Hyaline Casts  1  


 


Urine Mucus  Rare  


 


RPR Titer   














  17





  07:40


 


WBC 


 


RBC 


 


Hgb 


 


Hct 


 


MCV 


 


MCH 


 


MCHC 


 


RDW 


 


Plt Count 


 


MPV 


 


Sodium 


 


Potassium 


 


Chloride 


 


Carbon Dioxide 


 


Anion Gap 


 


BUN 


 


Creatinine 


 


Creat Clearance w eGFR 


 


Random Glucose 


 


Calcium 


 


Total Bilirubin 


 


AST 


 


ALT 


 


Alkaline Phosphatase 


 


Total Protein 


 


Albumin 


 


Urine Color 


 


Urine Appearance 


 


Urine pH 


 


Ur Specific Gravity 


 


Urine Protein 


 


Urine Glucose (UA) 


 


Urine Ketones 


 


Urine Blood 


 


Urine Nitrite 


 


Urine Bilirubin 


 


Urine Urobilinogen 


 


Ur Leukocyte Esterase 


 


Urine RBC 


 


Urine WBC 


 


Ur Epithelial Cells 


 


Hyaline Casts 


 


Urine Mucus 


 


RPR Titer  Nonreactive








awake/alert


ambulating


no acute distress


Assessment: 





08/10/17 09:33


withdrawal sx


Plan: 


continue detox


increase fluids


analgesic balm


motrin prn

## 2017-08-11 RX ADMIN — IBUPROFEN PRN MG: 400 TABLET, FILM COATED ORAL at 05:53

## 2017-08-11 RX ADMIN — PANTOPRAZOLE SODIUM SCH MG: 40 TABLET, DELAYED RELEASE ORAL at 10:48

## 2017-08-11 RX ADMIN — ASPIRIN 81 MG SCH MG: 81 TABLET ORAL at 10:48

## 2017-08-11 RX ADMIN — IBUPROFEN PRN MG: 400 TABLET, FILM COATED ORAL at 10:50

## 2017-08-11 RX ADMIN — METHADONE HYDROCHLORIDE SCH MG: 40 TABLET ORAL at 05:51

## 2017-08-11 RX ADMIN — LOPERAMIDE HYDROCHLORIDE PRN MG: 2 CAPSULE ORAL at 22:13

## 2017-08-11 RX ADMIN — Medication SCH MG: at 22:11

## 2017-08-11 RX ADMIN — QUETIAPINE SCH MG: 400 TABLET, FILM COATED ORAL at 22:11

## 2017-08-11 RX ADMIN — Medication SCH: at 10:48

## 2017-08-11 RX ADMIN — ATORVASTATIN CALCIUM SCH MG: 10 TABLET, FILM COATED ORAL at 22:11

## 2017-08-11 RX ADMIN — ANALGESIC BALM SCH APPLIC: 1.74; 4.06 OINTMENT TOPICAL at 22:12

## 2017-08-11 RX ADMIN — ANALGESIC BALM SCH APPLIC: 1.74; 4.06 OINTMENT TOPICAL at 10:49

## 2017-08-11 RX ADMIN — IBUPROFEN PRN MG: 400 TABLET, FILM COATED ORAL at 21:17

## 2017-08-11 RX ADMIN — NICOTINE SCH MG: 21 PATCH TRANSDERMAL at 10:49

## 2017-08-11 RX ADMIN — NICOTINE POLACRILEX PRN MG: 2 GUM, CHEWING ORAL at 10:51

## 2017-08-11 NOTE — PN
[facility] Progress Note (SOAP)


Subjective: 


feeling better just achy


Objective: 





08/11/17 08:43


 Vital Signs











Temperature  98.3 F   08/11/17 06:33


 


Pulse Rate  86   08/11/17 06:33


 


Respiratory Rate  18   08/11/17 06:33


 


Blood Pressure  121/84   08/11/17 06:33


 


O2 Sat by Pulse Oximetry (%)      








awake/alert


ambulating


no acute distress


Assessment: 





08/11/17 08:43


mild withdrawal sx


Plan: 


continue detox


d/c in am

## 2017-08-12 RX ADMIN — ASPIRIN 81 MG SCH MG: 81 TABLET ORAL at 10:34

## 2017-08-12 RX ADMIN — LOPERAMIDE HYDROCHLORIDE PRN MG: 2 CAPSULE ORAL at 06:51

## 2017-08-12 RX ADMIN — ANALGESIC BALM SCH APPLIC: 1.74; 4.06 OINTMENT TOPICAL at 22:38

## 2017-08-12 RX ADMIN — PANTOPRAZOLE SODIUM SCH MG: 40 TABLET, DELAYED RELEASE ORAL at 10:36

## 2017-08-12 RX ADMIN — LOPERAMIDE HYDROCHLORIDE PRN MG: 2 CAPSULE ORAL at 14:53

## 2017-08-12 RX ADMIN — ATORVASTATIN CALCIUM SCH MG: 10 TABLET, FILM COATED ORAL at 22:39

## 2017-08-12 RX ADMIN — QUETIAPINE SCH MG: 400 TABLET, FILM COATED ORAL at 22:39

## 2017-08-12 RX ADMIN — Medication SCH MG: at 22:39

## 2017-08-12 RX ADMIN — LOPERAMIDE HYDROCHLORIDE PRN MG: 2 CAPSULE ORAL at 22:39

## 2017-08-12 RX ADMIN — NICOTINE SCH MG: 21 PATCH TRANSDERMAL at 10:37

## 2017-08-12 RX ADMIN — ANALGESIC BALM SCH APPLIC: 1.74; 4.06 OINTMENT TOPICAL at 10:35

## 2017-08-12 RX ADMIN — Medication SCH TAB: at 10:35

## 2017-08-12 RX ADMIN — IBUPROFEN PRN MG: 400 TABLET, FILM COATED ORAL at 14:55

## 2017-08-12 RX ADMIN — METHADONE HYDROCHLORIDE SCH MG: 40 TABLET ORAL at 06:51

## 2017-08-12 NOTE — PN
[facility] Progress Note (SOAP)


Subjective: 


last day of meds,last dose at 5PM today, no beds in rehab. It's too late for 

pt. to get to [facility] before they close. 


Objective: 





08/12/17 10:31


 Vital Signs - 8 hr











  08/12/17 08/12/17 08/12/17





  03:30 06:00 10:00


 


Temperature  100.8 F H 99 F


 


Pulse Rate  85 89


 


Respiratory 18 20 20





Rate   


 


Blood Pressure  119/79 131/84








 Laboratory Last Values











WBC  5.6 K/mm3 (4.0-10.0)   08/09/17  07:40    


 


RBC  4.17 M/mm3 (3.60-5.2)   08/09/17  07:40    


 


Hgb  11.5 GM/dL (10.7-15.3)   08/09/17  07:40    


 


Hct  36.3 % (32.4-45.2)   08/09/17  07:40    


 


MCV  87.0 fl (80-96)   08/09/17  07:40    


 


MCH  27.6 pg (25.7-33.7)   08/09/17  07:40    


 


MCHC  31.7 g/dl (32.0-36.0)  L  08/09/17  07:40    


 


RDW  16.3 % (11.6-15.6)  H  08/09/17  07:40    


 


Plt Count  197 K/MM3 (134-434)  D 08/09/17  07:40    


 


MPV  9.6 fl (7.5-11.1)   08/09/17  07:40    


 


Sodium  139 mmol/L (136-145)   08/09/17  07:40    


 


Potassium  4.0 mmol/L (3.5-5.1)   08/09/17  07:40    


 


Chloride  104 mmol/L ()   08/09/17  07:40    


 


Carbon Dioxide  28 mmol/L (21-32)   08/09/17  07:40    


 


Anion Gap  7  (8-16)  L  08/09/17  07:40    


 


BUN  15 mg/dL (7-18)  D 08/09/17  07:40    


 


Creatinine  0.8 mg/dL (0.55-1.02)  D 08/09/17  07:40    


 


Creat Clearance w eGFR  > 60  (>60)   08/09/17  07:40    


 


Random Glucose  66 mg/dL ()  L D 08/09/17  07:40    


 


Calcium  9.4 mg/dL (8.5-10.1)   08/09/17  07:40    


 


Total Bilirubin  0.2 mg/dL (0.2-1.0)  D 08/09/17  07:40    


 


AST  34 U/L (15-37)  D 08/09/17  07:40    


 


ALT  35 U/L (12-78)  D 08/09/17  07:40    


 


Alkaline Phosphatase  89 U/L ()  D 08/09/17  07:40    


 


Total Protein  6.5 g/dl (6.4-8.2)  D 08/09/17  07:40    


 


Albumin  3.0 g/dl (3.4-5.0)  L D 08/09/17  07:40    


 


Urine Color  Yellow   08/08/17  18:25    


 


Urine Appearance  Slcloudy   08/08/17  18:25    


 


Urine pH  5.0  (5.0-8.0)   08/08/17  18:25    


 


Ur Specific Gravity  1.025  (1.005-1.025)   08/08/17  18:25    


 


Urine Protein  Negative  (NEGATIVE)   08/08/17  18:25    


 


Urine Glucose (UA)  Negative  (NEGATIVE)   08/08/17  18:25    


 


Urine Ketones  Negative  (NEGATIVE)   08/08/17  18:25    


 


Urine Blood  Negative  (NEGATIVE)   08/08/17  18:25    


 


Urine Nitrite  Negative  (NEGATIVE)   08/08/17  18:25    


 


Urine Bilirubin  Negative  (NEGATIVE)   08/08/17  18:25    


 


Urine Urobilinogen  Negative mg/dL (0.2-1.0)   08/08/17  18:25    


 


Ur Leukocyte Esterase  3+  (NEGATIVE)  H D 08/08/17  18:25    


 


Urine RBC  1 /hpf (0-3)   08/08/17  18:25    


 


Urine WBC  7 /hpf (3-5)   08/08/17  18:25    


 


Ur Epithelial Cells  Rare /hpf (FEW)   08/08/17  18:25    


 


Hyaline Casts  1 /lpf  08/08/17  18:25    


 


Urine Mucus  Rare   08/08/17  18:25    


 


RPR Titer  Nonreactive  (NONREACTIVE)   08/09/17  07:40    








labs noted


Assessment: 





08/12/17 10:32


Withdrawal sx.


Plan: 


Continue detox

## 2017-08-13 VITALS — DIASTOLIC BLOOD PRESSURE: 67 MMHG | TEMPERATURE: 97.9 F | SYSTOLIC BLOOD PRESSURE: 145 MMHG | HEART RATE: 77 BPM

## 2017-08-13 RX ADMIN — IBUPROFEN PRN MG: 400 TABLET, FILM COATED ORAL at 08:43

## 2017-08-13 RX ADMIN — METHADONE HYDROCHLORIDE SCH MG: 40 TABLET ORAL at 05:57

## 2017-08-13 RX ADMIN — ASPIRIN 81 MG SCH MG: 81 TABLET ORAL at 09:36

## 2017-08-13 RX ADMIN — Medication SCH TAB: at 09:36

## 2017-08-13 RX ADMIN — PANTOPRAZOLE SODIUM SCH MG: 40 TABLET, DELAYED RELEASE ORAL at 09:36

## 2017-08-13 NOTE — DS
[facility] Detox Discharge Summary


Admission Date: 


08/08/17





Discharge Date: 08/13/17





- History


Present History: Alcohol Dependence, Sedative Dependence, MMTP


Pertinent Past History: 


Asthma


Hep C





- Physical Exam Results


Vital Signs: 


 Vital Signs











Temperature  97.9 F   08/13/17 10:00


 


Pulse Rate  77   08/13/17 10:00


 


Respiratory Rate  18   08/13/17 10:00


 


Blood Pressure  145/67   08/13/17 10:00


 


O2 Sat by Pulse Oximetry (%)      











Pertinent Admission Physical Exam Findings: 


Withdrawal sx.


 Laboratory Last Values











WBC  5.6 K/mm3 (4.0-10.0)   08/09/17  07:40    


 


RBC  4.17 M/mm3 (3.60-5.2)   08/09/17  07:40    


 


Hgb  11.5 GM/dL (10.7-15.3)   08/09/17  07:40    


 


Hct  36.3 % (32.4-45.2)   08/09/17  07:40    


 


MCV  87.0 fl (80-96)   08/09/17  07:40    


 


MCH  27.6 pg (25.7-33.7)   08/09/17  07:40    


 


MCHC  31.7 g/dl (32.0-36.0)  L  08/09/17  07:40    


 


RDW  16.3 % (11.6-15.6)  H  08/09/17  07:40    


 


Plt Count  197 K/MM3 (134-434)  D 08/09/17  07:40    


 


MPV  9.6 fl (7.5-11.1)   08/09/17  07:40    


 


Sodium  139 mmol/L (136-145)   08/09/17  07:40    


 


Potassium  4.0 mmol/L (3.5-5.1)   08/09/17  07:40    


 


Chloride  104 mmol/L ()   08/09/17  07:40    


 


Carbon Dioxide  28 mmol/L (21-32)   08/09/17  07:40    


 


Anion Gap  7  (8-16)  L  08/09/17  07:40    


 


BUN  15 mg/dL (7-18)  D 08/09/17  07:40    


 


Creatinine  0.8 mg/dL (0.55-1.02)  D 08/09/17  07:40    


 


Creat Clearance w eGFR  > 60  (>60)   08/09/17  07:40    


 


Random Glucose  66 mg/dL ()  L D 08/09/17  07:40    


 


Calcium  9.4 mg/dL (8.5-10.1)   08/09/17  07:40    


 


Total Bilirubin  0.2 mg/dL (0.2-1.0)  D 08/09/17  07:40    


 


AST  34 U/L (15-37)  D 08/09/17  07:40    


 


ALT  35 U/L (12-78)  D 08/09/17  07:40    


 


Alkaline Phosphatase  89 U/L ()  D 08/09/17  07:40    


 


Total Protein  6.5 g/dl (6.4-8.2)  D 08/09/17  07:40    


 


Albumin  3.0 g/dl (3.4-5.0)  L D 08/09/17  07:40    


 


Urine Color  Yellow   08/08/17  18:25    


 


Urine Appearance  Slcloudy   08/08/17  18:25    


 


Urine pH  5.0  (5.0-8.0)   08/08/17  18:25    


 


Ur Specific Gravity  1.025  (1.005-1.025)   08/08/17  18:25    


 


Urine Protein  Negative  (NEGATIVE)   08/08/17  18:25    


 


Urine Glucose (UA)  Negative  (NEGATIVE)   08/08/17  18:25    


 


Urine Ketones  Negative  (NEGATIVE)   08/08/17  18:25    


 


Urine Blood  Negative  (NEGATIVE)   08/08/17  18:25    


 


Urine Nitrite  Negative  (NEGATIVE)   08/08/17  18:25    


 


Urine Bilirubin  Negative  (NEGATIVE)   08/08/17  18:25    


 


Urine Urobilinogen  Negative mg/dL (0.2-1.0)   08/08/17  18:25    


 


Ur Leukocyte Esterase  3+  (NEGATIVE)  H D 08/08/17  18:25    


 


Urine RBC  1 /hpf (0-3)   08/08/17  18:25    


 


Urine WBC  7 /hpf (3-5)   08/08/17  18:25    


 


Ur Epithelial Cells  Rare /hpf (FEW)   08/08/17  18:25    


 


Hyaline Casts  1 /lpf  08/08/17  18:25    


 


Urine Mucus  Rare   08/08/17  18:25    


 


RPR Titer  Nonreactive  (NONREACTIVE)   08/09/17  07:40    








labs noted





- Treatment


Hospital Course: Detox Protocol Followed, Detoxed Safely, Responded well, 

Discharged Condition Good, Rehab Referral Accepted


Patient has Accepted a Rehab Referral to: [facility]





- Medication


Discharge Medications: 


Ambulatory Orders





Quetiapine Fumarate [Seroquel -] 400 mg PO BID 05/25/16 


Aspirin [ASA -] 81 mg PO DAILY #30 tab.chew 08/13/16 


Pravastatin Sodium [Pravachol -] 20 mg PO HS #30 tablet 08/13/16 


Clonidine HCl 0.3 mg PO DAILY 01/18/17 


Omeprazole 20 mg PO DAILY 01/18/17 


Albuterol Sulfate Inhaler - [Ventolin Hfa Inhaler -] 2 inh PO Q4H PRN 08/08/17 


Quetiapine Fumarate [Seroquel -] 400 mg PO HS #30 tab 08/09/17 











- Diagnosis


(1) Alcohol dependence with uncomplicated withdrawal


Current Visit: No   Status: Acute





(2) Asthma


Current Visit: Yes   Status: Acute   Qualifiers: 


     Asthma severity: mild intermittent     Asthma complication type: 

uncomplicated        Qualified Code(s): J45.20 - Mild intermittent asthma, 

uncomplicated  





(3) Drug-induced mood disorder


Current Visit: Yes   Status: Acute





(4) GERD (gastroesophageal reflux disease)


Current Visit: Yes   Status: Acute   Qualifiers: 


     Esophagitis presence: without esophagitis        Qualified Code(s): K21.9 

- Gastro-esophageal reflux disease without esophagitis  





(5) HTN (hypertension)


Current Visit: Yes   Status: Acute   Qualifiers: 


     Hypertension type: essential hypertension        Qualified Code(s): I10 - 

Essential (primary) hypertension  





(6) Nicotine dependence


Current Visit: Yes   Status: Acute   Qualifiers: 


     Nicotine product type: cigarettes     Substance use status: uncomplicated 

    Qualified Code(s): F17.210 - Nicotine dependence, cigarettes, uncomplicated

  





(7) Hepatitis C


Current Visit: Yes   Status: Chronic   Qualifiers: 


     Viral hepatitis chronicity: chronic     Hepatic coma status: without 

hepatic coma        Qualified Code(s): B18.2 - Chronic viral hepatitis C  





(8) Leg pain, right


Current Visit: Yes   Status: Chronic





(9) Swelling of knee joint, right


Current Visit: Yes   Status: Chronic





(10) Bipolar II disorder


Current Visit: Yes   Status: Suspected








- AMA


Did Patient Leave Against Medical Advice: No

## 2018-03-09 ENCOUNTER — HOSPITAL ENCOUNTER (INPATIENT)
Dept: HOSPITAL 74 - YASAS | Age: 53
LOS: 4 days | Discharge: HOME | DRG: 773 | End: 2018-03-13
Attending: INTERNAL MEDICINE | Admitting: INTERNAL MEDICINE
Payer: COMMERCIAL

## 2018-03-09 VITALS — BODY MASS INDEX: 32.8 KG/M2

## 2018-03-09 DIAGNOSIS — E78.5: ICD-10-CM

## 2018-03-09 DIAGNOSIS — F17.210: ICD-10-CM

## 2018-03-09 DIAGNOSIS — F11.20: ICD-10-CM

## 2018-03-09 DIAGNOSIS — B18.2: ICD-10-CM

## 2018-03-09 DIAGNOSIS — R29.2: ICD-10-CM

## 2018-03-09 DIAGNOSIS — Z99.89: ICD-10-CM

## 2018-03-09 DIAGNOSIS — I10: ICD-10-CM

## 2018-03-09 DIAGNOSIS — F10.230: Primary | ICD-10-CM

## 2018-03-09 DIAGNOSIS — F31.81: ICD-10-CM

## 2018-03-09 DIAGNOSIS — R00.1: ICD-10-CM

## 2018-03-09 DIAGNOSIS — K59.01: ICD-10-CM

## 2018-03-09 DIAGNOSIS — Z91.5: ICD-10-CM

## 2018-03-09 DIAGNOSIS — J45.22: ICD-10-CM

## 2018-03-09 DIAGNOSIS — F14.20: ICD-10-CM

## 2018-03-09 DIAGNOSIS — K21.9: ICD-10-CM

## 2018-03-09 DIAGNOSIS — F19.24: ICD-10-CM

## 2018-03-09 LAB
APPEARANCE UR: (no result)
BACTERIA #/AREA URNS HPF: (no result) /HPF
BILIRUB UR STRIP.AUTO-MCNC: NEGATIVE MG/DL
COLOR UR: YELLOW
EPITH CASTS URNS QL MICRO: (no result) /HPF
KETONES UR QL STRIP: NEGATIVE
LEUKOCYTE ESTERASE UR QL STRIP.AUTO: (no result)
MUCOUS THREADS URNS QL MICRO: (no result)
NITRITE UR QL STRIP: NEGATIVE
PH UR: 5 [PH] (ref 5–8)
PROT UR QL STRIP: NEGATIVE
PROT UR QL STRIP: NEGATIVE
RBC # UR STRIP: NEGATIVE /UL
SP GR UR: 1.01 (ref 1–1.03)
UROBILINOGEN UR STRIP-MCNC: 2 MG/DL (ref 0.2–1)

## 2018-03-09 PROCEDURE — HZ2ZZZZ DETOXIFICATION SERVICES FOR SUBSTANCE ABUSE TREATMENT: ICD-10-PCS | Performed by: INTERNAL MEDICINE

## 2018-03-09 NOTE — HP
COWS





- Scale


Resting Pulse: 0= MS 80 or Below


Sweatin=Flushed/Facial Moisture


Restless Observation: 5= Unable to Sit Still


Pupil Size: 0= Normal to Room Light


Bone or Joint Aches: 4=Acute Joint/Muscle Pain


Runny Nose/ Eye Tearin= Nasal Congestion


GI Upset > 30mins: 1= Stomach Cramp


Tremor Observation: 2= Slight Tremor Visible


Yawning Observation: 0= None


Anxiety or Irritability: 4=Extreme Anxiety


Goose Flesh Skin: 0=Smooth Skin


COWS Score: 19





CIWA Score





- CIWA Score


Nausea/Vomitin-No Nausea/No Vomiting


Muscle Tremors: 4-Moderate,w/Arms Extend


Anxiety: 4-Mod. Anxious/Guarded


Agitation: 4-Moderately Restless


Paroxysmal Sweats: 3


Orientation: 3-Disoriented Date>2 days


Tacttile Disturbances: 3-Moderate Itch/Numb/Burn


Auditory Disturbances: 0-None


Visual Disturbances: 0-None


Headache: 3-Moderate


CIWA-Ar Total Score: 24





[facility]





- HPI


Chief Complaint: 





C/O WITHDRAWAL S'S. SEEKING DETOX FOR POLYSUBSTANCE DEPENDENCE


Allergies/Adverse Reactions: 


 Allergies











Allergy/AdvReac Type Severity Reaction Status Date / Time


 


No Known Allergies Allergy   Verified 18 19:39











History of Present Illness: 





52 Y.O. FEMALE WITH HX/O POLYSUBSTANCE ABUSE HERE FOR DETOX. CLIENT IS KNOWN TO 

THIS PROGRAM. LAST HERE 2017. DENIES ANY SIGNIFICANT PERIOD OF CLEAN TIME. 


Exam Limitations: Physical Impairment (AMBULATES WITH CAN DUE TO UNSTEADY AGIT)





- Ebola screening


Have you traveled outside of the country in the last 21 days: No (N)


Have you had contact with anyone from an Ebola affected area: No


Have you been sick,other than usual withdrawal symptoms: No


Do you have a fever: No





- Review of Systems


Constitutional: Chills, Malaise, Night Sweats, Changes in sleep


EENT: reports: Nose Congestion, Other (GLASSES DENTURES)


Respiratory: reports: No Symptoms reported


Cardiac: reports: No Symptoms Reported


GI: reports: Nausea, Poor Appetite, Poor Fluid Intake, Abdominal cramping


: reports: No Symptoms Reported


Musculoskeletal: reports: Back Pain, Joint Pain


Integumentary: reports: No Symptoms Reported


Neuro: reports: Numbness (HANDS), Unsteady Gait


Hematology: reports: No Symptoms Reported


Psychiatric: reports: Anxious, Depressed, other (BIPOLAR)


Other Systems: Reviewed and Negative





Patient History





- Patient Medical History


Hx Anemia: No


Hx Asthma: No


Hx Chronic Obstructive Pulmonary Disease (COPD): No


Hx Cancer: No


Hx Cardiac Disorders: No


Hx Congestive Heart Failure: No


Hx Hypertension: Yes (CLONIDINE 0.3MG/ LISINOPRIL)


Hx Hypercholesterolemia: Yes (pravachol)


Hx Pacemaker: No


HX Cerebrovascular Accident: No


Hx Seizures: No


Hx Dementia: No


Hx Diabetes: No


Hx Gastrointestinal Disorders: Yes (PROTONIX)


Hx Liver Disease: Yes (HEP C NO TXMENT)


Hx Genitourinary Disorders: No


Hx Sexually Transmitted Disorders: Yes (syphillis TX'ED)


Hx Renal Disease (ESRD): No


Hx Thyroid Disease: No


Hx Human Immunodeficiency Virus (HIV): No


Hx Hepatitis C: Yes (DX 2000)


Hx Depression: Yes


Hx Suicide Attempt: Yes (x4 LAST 5YRS AGO cutter,overdose; PRESENTLY DENIES)


Hx Bipolar Disorder: Yes


Hx Schizophrenia: No


Other Medical History: ANXIETY





- Patient Surgical History


Past Surgical History: No


Hx Neurologic Surgery: No


Hx Cataract Extraction: No


Hx Cardiac Surgery: No


Hx Lung Surgery: No


Hx Breast Surgery: No


Hx Breast Biopsy: No


Hx Abdominal Surgery: No


Hx Appendectomy: No


Hx Cholecystectomy: No


Hx Genitourinary Surgery: No


Hx  Section: No


Hx Orthopedic Surgery: No


Hx Hysterectomy: No


Anesthesia Reaction: No





- PPD History


Previous Implant?: Yes


Documented Results: Negative w/proof


Implanted On Prior [facility] Admission?: Yes


Date: 08/10/17


Results: 0MM


PPD to be Administered?: No





- Reproductive History


Patient is a Female of Child Bearing Age (11 -55 yrs old): Yes


Last Menstrual Period: 12


[place] P[place]ant: [place] [facility]





- Smoking Cessation


Smoking history: Current every day smoker


Have you smoked in the past 12 months: Yes


Aproximately how many cigarettes per day: 10


Cigars Per Day: 0


Hx Chewing Tobacco Use: No


Initiated information on smoking cessation: Yes


'Breaking Loose' booklet given: 18





- Substance & Tx. History


Hx Alcohol Use: Yes


Hx Substance Use: Yes


Substance Use Type: Alcohol, Cocaine, Heroin, Opiates (STREET MTD), 

Tranquilizers (REPORTS XANAX UTOX NEG)


Hx Substance Use Treatment: Yes ([facility])





- Substances Abused


  ** Alcohol


Route: Oral


Frequency: Daily


Amount used: liquor- 1 quart,


Age of first use: 29


Date of Last Use: 18





  ** Heroin


Route: Injection


Frequency: Daily


Amount used: 10 bags


Age of first use: 19


Date of Last Use: 18





  ** COCAINE


Route: Smoking


Frequency: Daily


Amount used: $50


Age of first use: 18


Date of Last Use: 18





Family Disease History





- Family Disease History


Family Disease History: Diabetes: Mother (bladder ca ,), CA: Mother, 

Other: Grandparent (alcohol), Father (alcohol,)





Admission Physical Exam [facility]





- Vital Signs


Vital Signs: 


 Vital Signs - 24 hr











  18





  19:21


 


Temperature 95.9 F L


 


Pulse Rate 53 L


 


Respiratory 19





Rate 


 


Blood Pressure 138/70














- Physical


General Appearance: Yes: Appropriately Dressed, Mild Distress, Tremorous, 

Anxious


HEENTM: Yes: EOMI, Normocephalic, Normal Voice, ERIKA, Pharynx Normal, Nasal 

Congestion, Other (UPPER DENTURES)


Respiratory: Yes: Lungs Clear, Normal Breath Sounds, No Respiratory Distress, 

No Accessory Muscle Use


Neck: Yes: No masses,lesions,Nodules, Supple, Trachea in good position


Breast: Yes: Breast Exam Deferred


Cardiology: Yes: Regular Rhythm, S1, S2, Bradycardia


Abdominal: Yes: Non Tender, Soft, Protuberent


Genitourinary: Yes: Within Normal Limits


Back: Yes: Normal Inspection


Musculoskeletal: Yes: Other (UNSTEADY GAIT LIMITED ROM TO R NEE DUE TO PAIN)


Extremities: Yes: Normal Range of Motion, Non-Tender, Tremors


Neurological: Yes: Alert, Disoriented


Integumentary: Yes: Normal Color, Dry, Warm, Track Marks


Lymphatic: Yes: Adenopathy





- Diagnostic


(1) Alcohol dependence with uncomplicated withdrawal


Current Visit: No   Status: Chronic   





(2) Constipation


Current Visit: No   Status: Chronic   


Qualifiers: 


   Constipation type: slow transit constipation   Qualified Code(s): K59.01 - 

Slow transit constipation   





(3) GERD (gastroesophageal reflux disease)


Current Visit: No   Status: Acute   


Qualifiers: 


   Esophagitis presence: without esophagitis   Qualified Code(s): K21.9 - Gastro

-esophageal reflux disease without esophagitis   





(4) HTN (hypertension)


Current Visit: No   Status: Chronic   


Qualifiers: 


   Hypertension type: essential hypertension   Qualified Code(s): I10 - 

Essential (primary) hypertension   





(5) Hyperlipidemia


Current Visit: No   Status: Chronic   


Qualifiers: 


   Hyperlipidemia type: pure hypercholesterolemia   Qualified Code(s): E78.00 - 

Pure hypercholesterolemia, unspecified   





(6) Nicotine dependence


Current Visit: No   Status: Chronic   


Qualifiers: 


   Nicotine product type: cigarettes   Substance use status: uncomplicated   

Qualified Code(s): F17.210 - Nicotine dependence, cigarettes, uncomplicated   





(7) Use of cane as ambulatory aid


Current Visit: No   Status: Chronic   





(8) Cocaine dependence


Current Visit: No   Status: Chronic   


Qualifiers: 


   Substance use status: uncomplicated   Qualified Code(s): F14.20 - Cocaine 

dependence, uncomplicated   





(9) Hepatitis C


Current Visit: No   Status: Chronic   


Qualifiers: 


   Viral hepatitis chronicity: chronic   Hepatic coma status: without hepatic 

coma   Qualified Code(s): B18.2 - Chronic viral hepatitis C   





(10) Swelling of knee joint, right


Current Visit: No   Status: Chronic   Comment: cane to ambulate   





Cleared for Admission [facility]





- Detox or Rehab


[facility] Level of Care: Medically Managed


Detox Regimen/Protocol: Methadone/Librium


Claeared for Rehab Admission: No





[facility] Breath Alcohol Content


Breath Alcohol Content: 0





Urine Pregancy Test





- Result


Urine Pregnancy Test Results: Negative- NO Line Present





Urine Drug Screen





- Results


Drug Screen Negative: No


Urine Drug Screen Results: ORIANA-Cocaine, OPI-Opiates, MTD-Methadone

## 2018-03-10 LAB
ALBUMIN SERPL-MCNC: 3.3 G/DL (ref 3.4–5)
ALP SERPL-CCNC: 106 U/L (ref 45–117)
ALT SERPL-CCNC: 30 U/L (ref 12–78)
ANION GAP SERPL CALC-SCNC: 7 MMOL/L (ref 8–16)
AST SERPL-CCNC: 37 U/L (ref 15–37)
BILIRUB SERPL-MCNC: 0.2 MG/DL (ref 0.2–1)
BUN SERPL-MCNC: 19 MG/DL (ref 7–18)
CALCIUM SERPL-MCNC: 8.4 MG/DL (ref 8.5–10.1)
CHLORIDE SERPL-SCNC: 107 MMOL/L (ref 98–107)
CO2 SERPL-SCNC: 28 MMOL/L (ref 21–32)
CREAT SERPL-MCNC: 1.1 MG/DL (ref 0.55–1.02)
DEPRECATED RDW RBC AUTO: 15.2 % (ref 11.6–15.6)
GLUCOSE SERPL-MCNC: 131 MG/DL (ref 74–106)
HCT VFR BLD CALC: 36.7 % (ref 32.4–45.2)
HGB BLD-MCNC: 12.1 GM/DL (ref 10.7–15.3)
MCH RBC QN AUTO: 28.2 PG (ref 25.7–33.7)
MCHC RBC AUTO-ENTMCNC: 33 G/DL (ref 32–36)
MCV RBC: 85.2 FL (ref 80–96)
PLATELET # BLD AUTO: 233 K/MM3 (ref 134–434)
PMV BLD: 9.3 FL (ref 7.5–11.1)
POTASSIUM SERPLBLD-SCNC: 3.7 MMOL/L (ref 3.5–5.1)
PROT SERPL-MCNC: 6.6 G/DL (ref 6.4–8.2)
RBC # BLD AUTO: 4.31 M/MM3 (ref 3.6–5.2)
SODIUM SERPL-SCNC: 142 MMOL/L (ref 136–145)
WBC # BLD AUTO: 5.6 K/MM3 (ref 4–10)

## 2018-03-10 RX ADMIN — DOCUSATE SODIUM SCH MG: 100 CAPSULE, LIQUID FILLED ORAL at 22:23

## 2018-03-10 RX ADMIN — Medication SCH: at 01:35

## 2018-03-10 RX ADMIN — NICOTINE SCH MG: 14 PATCH, EXTENDED RELEASE TRANSDERMAL at 10:36

## 2018-03-10 RX ADMIN — DOCUSATE SODIUM SCH: 100 CAPSULE, LIQUID FILLED ORAL at 01:34

## 2018-03-10 RX ADMIN — HYDROXYZINE PAMOATE PRN MG: 50 CAPSULE ORAL at 19:52

## 2018-03-10 RX ADMIN — IBUPROFEN PRN MG: 400 TABLET, FILM COATED ORAL at 12:59

## 2018-03-10 RX ADMIN — IBUPROFEN PRN MG: 400 TABLET, FILM COATED ORAL at 22:27

## 2018-03-10 RX ADMIN — Medication SCH TAB: at 10:30

## 2018-03-10 RX ADMIN — ASPIRIN 81 MG SCH MG: 81 TABLET ORAL at 10:29

## 2018-03-10 RX ADMIN — Medication SCH: at 22:24

## 2018-03-10 RX ADMIN — LIDOCAINE SCH PATCH: 50 PATCH TOPICAL at 10:36

## 2018-03-10 RX ADMIN — Medication SCH MG: at 22:23

## 2018-03-10 NOTE — CONSULT
[facility] Psychiatric Consult





- Data


Date of interview: 03/10/18


Admission source: [facility]


Identifying data: This is one of several admissions to [facility] for this 52 y/

o AA female seeking detox treatment on [address] for alcohol,heroin,cocaine and 

xanax dependence.Patient is single,a mother of one,domiciled,unemployed and 

supported on SSI benefits.


Substance Abuse History: Discussed with patient in this interview.Ms Wood 

endorses an enduring history (more than 20 years) of dependence on multiple 

substances (xanax,alcohol,cocaine and heroin). See details in the current [facility] 

report described by patient as an accurate narrative on her addictions : 

Smoking history: Current every day smoker.  Have you smoked in the past 12 

months: Yes.  Aproximately how many cigarettes per day: 10.  Cigars Per Day: 0.

  Hx Chewing Tobacco Use: No.  Initiated information on smoking cessation: Yes.

  'Breaking Loose' booklet given: 03/09/18.  - Substance & Tx. History.  Hx 

Alcohol Use: Yes.  Hx Substance Use: Yes.  Substance Use Type: Alcohol, Cocaine

, Heroin, Opiates (STREET MTD), Tranquilizers (REPORTS XANAX UTOX NEG).  Hx 

Substance Use Treatment: Yes ([facility]).  - Substances Abused.  ** Alcohol.  Route: 

Oral.  Frequency: Daily.  Amount used: liquor- 1 quart,.  Age of first use: 29.

  Date of Last Use: 03/09/18.  ** Heroin.  Route: Injection.  Frequency: Daily.

  Amount used: 10 bags.  Age of first use: 19.  Date of Last Use: 03/08/18.  ** 

COCAINE.  Route: Smoking.  Frequency: Daily.  Amount used: $50.  Age of first 

use: 18.  Date of Last Use: 03/08/18


Medical History: Consistent with bronchial asthma,hypercholesterolemia,

hypertension,hepatitis C and a remote history of treatment for syphilis.


Psychiatric History: Patient reports a history of 3-5 psychiatric 

hospitalizations ([facility] in [place])

.Diagnosed with Bipolar Disorder (2000). and has had 4 previous psychiatric 

hospitalizations.Ms Wood gets her OPD psychiatric services at the [facility] in [facility].Maintained on a regimen of seroquel 400 mg/hs + vistaril 

100 mg po bid (self-report).Patient declares two previous suicide attempts (

overdose with medications + wrist-cutting).


Physical/Sexual Abuse/Trauma History: Patient declines to discuss this domain.


Additional Comment: Urine Drug Screen Results: ORIANA-Cocaine, OPI-Opiates, MTD-

Methadone.Noted.





Mental Status Exam





- Mental Status Exam


Alert and Oriented to: Time, Place, Person


Cognitive Function: Good


Patient Appearance: Well Groomed (obese)


Mood: Withdrawn, Anxious


Affect: Mood Congruent


Patient Behavior: Fatigued, Appropriate, Cooperative


Speech Pattern: Clear, Appropriate


Voice Loudness: Normal


Thought Process: Goal Oriented


Thought Disorder: Not Present


Hallucinations: Denies


Suicidal Ideation: Denies


Homicidal Ideation: Denies


Insight/Judgement: Poor


Sleep: Poorly, Difficulty falling asleep


Appetite: Good


Gait/Station: Other (observed moving around in a wheelchair)





Psychiatric Findings





- Problem List (Axis 1, 2,3)


(1) Alcohol dependence with uncomplicated withdrawal


Current Visit: Yes   Status: Acute   





(2) Opioid dependence on agonist therapy


Current Visit: Yes   Status: Acute   





(3) Sedative hypnotic or anxiolytic dependence


Current Visit: Yes   Status: Acute   





(4) Cocaine dependence


Current Visit: Yes   Status: Acute   


Qualifiers: 


   Substance use status: uncomplicated   Qualified Code(s): F14.20 - Cocaine 

dependence, uncomplicated   





(5) Nicotine dependence


Current Visit: Yes   Status: Acute   


Qualifiers: 


   Nicotine product type: cigarettes   Substance use status: uncomplicated   

Qualified Code(s): F17.210 - Nicotine dependence, cigarettes, uncomplicated   





(6) Drug-induced mood disorder


Current Visit: Yes   Status: Acute   





(7) Bipolar disorder


Current Visit: Yes   Status: Chronic   Comment: As per self-report.On 

medications.Psychiatric OPD care at the [facility] in [facility].

   





(8) Insomnia


Current Visit: Yes   Status: Acute   





- Initial Treatment Plan


Initial Treatment Plan: Previous records are revisited.Sleep 

hygiene.Psychoeducation.Falls precautions.Detoxification in 

progress.Medications : seroquel 200 mg po hs (reduced in view of past history 

of oversedation).Refer to Dr Toledo's note of 1/20/17 for details.Side 

effects/benefits are discussed with the patient.Ms Wood is in agreeement with 

this plan of care.Observation.Pharmacy claims reviewed : noted refill for 

seroquel 400 mg/hs # 30 tablets issued (Dr Gaby Rodriguez) on 2/5/18.Titration to 

400/mg hs will follow if no occurence of oversedation / alteration of mental 

status in next 48 hours.

## 2018-03-10 NOTE — PN
S CIWA





- CIWA Score


Nausea/Vomiting: 3


Muscle Tremors: 3


Anxiety: 3


Agitation: 3


Paroxysmal Sweats: 3


Orientation: 0-Oriented


Tacttile Disturbances: 0-None


Auditory Disturbances: 0-None


Visual Disturbances: 0-None


Headache: 0-None Present


CIWA-Ar Total Score: 15





[facility] COWS





- Scale


Resting Pulse: 0= MI 80 or Below


Sweatin=Flushed/Facial Moisture


Restless Observation: 3= Extraneous Movement


Pupil Size: 0= Normal to Room Light


Bone or Joint Aches: 1= Mild Discomfort


Runny Nose/ Eye Tearin= Nasal Congestion


GI Upset > 30mins: 1= Stomach Cramp


Tremor Observation of Outstretched Hands: 2= Slight Tremor Visible


Yawning Observation: 1= 1-2x During Session


Anxiety or Irritability: 2=Irritable/Anxious


Goose Flesh Skin: 0=Smooth Skin


COWS Score: 13





BHS Progress Note (SOAP)


Subjective: 





shakes


sweats


Objective: 





03/10/18 16:40


A & o x 3


ambulates with cane


 Vital Signs











Temperature  97.9 F   03/10/18 14:32


 


Pulse Rate  60   03/10/18 14:32


 


Respiratory Rate  20   03/10/18 14:32


 


Blood Pressure  133/76   03/10/18 14:32


 


O2 Sat by Pulse Oximetry (%)      








 Laboratory Last Values











WBC  5.6 K/mm3 (4.0-10.0)   03/10/18  08:00    


 


RBC  4.31 M/mm3 (3.60-5.2)   03/10/18  08:00    


 


Hgb  12.1 GM/dL (10.7-15.3)   03/10/18  08:00    


 


Hct  36.7 % (32.4-45.2)   03/10/18  08:00    


 


MCV  85.2 fl (80-96)   03/10/18  08:00    


 


MCH  28.2 pg (25.7-33.7)   03/10/18  08:00    


 


MCHC  33.0 g/dl (32.0-36.0)   03/10/18  08:00    


 


RDW  15.2 % (11.6-15.6)   03/10/18  08:00    


 


Plt Count  233 K/MM3 (134-434)   03/10/18  08:00    


 


MPV  9.3 fl (7.5-11.1)   03/10/18  08:00    


 


Sodium  142 mmol/L (136-145)   03/10/18  08:00    


 


Potassium  3.7 mmol/L (3.5-5.1)   03/10/18  08:00    


 


Chloride  107 mmol/L ()   03/10/18  08:00    


 


Carbon Dioxide  28 mmol/L (21-32)   03/10/18  08:00    


 


Anion Gap  7  (8-16)  L  03/10/18  08:00    


 


BUN  19 mg/dL (7-18)  H  03/10/18  08:00    


 


Creatinine  1.1 mg/dL (0.55-1.02)  H  03/10/18  08:00    


 


Creat Clearance w eGFR  51.96  (>60)   03/10/18  08:00    


 


Random Glucose  131 mg/dL ()  H  03/10/18  08:00    


 


Calcium  8.4 mg/dL (8.5-10.1)  L  03/10/18  08:00    


 


Total Bilirubin  0.2 mg/dL (0.2-1.0)   03/10/18  08:00    


 


AST  37 U/L (15-37)   03/10/18  08:00    


 


ALT  30 U/L (12-78)   03/10/18  08:00    


 


Alkaline Phosphatase  106 U/L ()   03/10/18  08:00    


 


Total Protein  6.6 g/dl (6.4-8.2)   03/10/18  08:00    


 


Albumin  3.3 g/dl (3.4-5.0)  L  03/10/18  08:00    


 


Urine Color  Yellow   18  23:40    


 


Urine Appearance  Cloudy   18  23:40    


 


Urine pH  5.0  (5.0-8.0)   18  23:40    


 


Ur Specific Gravity  1.014  (1.001-1.035)   18  23:40    


 


Urine Protein  Negative  (NEGATIVE)   18  23:40    


 


Urine Glucose (UA)  Negative  (NEGATIVE)   18  23:40    


 


Urine Ketones  Negative  (NEGATIVE)   18  23:40    


 


Urine Blood  Negative  (NEGATIVE)   18  23:40    


 


Urine Nitrite  Negative  (NEGATIVE)   18  23:40    


 


Urine Bilirubin  Negative  (NEGATIVE)   18  23:40    


 


Urine Urobilinogen  2.0 mg/dL (0.2-1.0)  H  18  23:40    


 


Ur Leukocyte Esterase  3+  (NEGATIVE)  H  18  23:40    


 


Urine WBC (Auto)  60 /hpf (3-5)   18  23:40    


 


Urine RBC (Auto)  4 /hpf (0-3)   18  23:40    


 


Ur Epithelial Cells  Few /HPF (FEW)   18  23:40    


 


Urine Bacteria  Moderate /hpf (NONE SEEN)   18  23:40    


 


Urine Mucus  Few   18  23:40    


 


RPR Titer  Nonreactive  (NONREACTIVE)   03/10/18  08:00    








labs noted, leukocyte in urine- denies itching, burning or urinary complaint


Assessment: 





03/10/18 16:44


withdrawal sx


Plan: 





continue detox


increase hydration

## 2018-03-11 RX ADMIN — LIDOCAINE SCH PATCH: 50 PATCH TOPICAL at 10:50

## 2018-03-11 RX ADMIN — ACETAMINOPHEN PRN MG: 325 TABLET ORAL at 06:30

## 2018-03-11 RX ADMIN — Medication SCH EACH: at 22:44

## 2018-03-11 RX ADMIN — Medication SCH TAB: at 10:47

## 2018-03-11 RX ADMIN — HYDROXYZINE PAMOATE PRN MG: 50 CAPSULE ORAL at 11:19

## 2018-03-11 RX ADMIN — DOCUSATE SODIUM SCH MG: 100 CAPSULE, LIQUID FILLED ORAL at 22:44

## 2018-03-11 RX ADMIN — ASPIRIN 81 MG SCH MG: 81 TABLET ORAL at 10:46

## 2018-03-11 RX ADMIN — Medication SCH MG: at 22:44

## 2018-03-11 RX ADMIN — NICOTINE SCH MG: 14 PATCH, EXTENDED RELEASE TRANSDERMAL at 10:47

## 2018-03-11 RX ADMIN — HYDROXYZINE PAMOATE PRN MG: 50 CAPSULE ORAL at 22:46

## 2018-03-11 RX ADMIN — MAGNESIUM HYDROXIDE PRN ML: 400 SUSPENSION ORAL at 11:16

## 2018-03-11 RX ADMIN — HYDROXYZINE PAMOATE PRN MG: 50 CAPSULE ORAL at 17:48

## 2018-03-11 NOTE — EKG
Test Reason : 

Blood Pressure : ***/*** mmHG

Vent. Rate : 052 BPM     Atrial Rate : 052 BPM

   P-R Int : 152 ms          QRS Dur : 082 ms

    QT Int : 512 ms       P-R-T Axes : 051 051 026 degrees

   QTc Int : 476 ms

 

SINUS BRADYCARDIA

NONSPECIFIC ST AND T WAVE ABNORMALITY

WHEN COMPARED WITH ECG OF 10-MAR-2018 00:14,

T WAVE VARIATION

Confirmed by SHERRI PHAN MD ([id number]) on 3/11/2018 8:36:32 PM

 

Referred By:             Confirmed By:SHERRI PHAN MD

## 2018-03-11 NOTE — EKG
Test Reason : 

Blood Pressure : ***/*** mmHG

Vent. Rate : 045 BPM     Atrial Rate : 045 BPM

   P-R Int : 150 ms          QRS Dur : 082 ms

    QT Int : 542 ms       P-R-T Axes : 054 049 027 degrees

   QTc Int : 468 ms

 

SINUS BRADYCARDIA

NONSPECIFIC T WAVE ABNORMALITY

PROLONGED QT

ABNORMAL ECG

WHEN COMPARED WITH ECG OF 08-AUG-2017 17:05,

VENT. RATE HAS DECREASED BY  33 BPM

T WAVE VARIATION

Confirmed by SYLVESTER SARMIENTO, SHERRI ([id number]) on 3/11/2018 8:36:56 PM

 

Referred By:             Confirmed By:SHERRI PHAN MD

## 2018-03-11 NOTE — PN
[facility] CIWA





- CIWA Score


Nausea/Vomitin-Mild Nausea/No Vomiting


Muscle Tremors: 3


Anxiety: 3


Agitation: 3


Paroxysmal Sweats: 1-Minimal Palms Moist


Orientation: 0-Oriented


Tacttile Disturbances: 1-Very Mild Itch/Numbness


Auditory Disturbances: 0-None


Visual Disturbances: 0-None


Headache: 1-Very Mild


CIWA-Ar Total Score: 13





[facility] COWS





- Scale


Resting Pulse: 0= OR 80 or Below


Sweatin= Chills/Flushing


Restless Observation: 1= Difficult to Sit Still


Pupil Size: 0= Normal to Room Light


Bone or Joint Aches: 1= Mild Discomfort


Runny Nose/ Eye Tearin= Nasal Congestion


GI Upset > 30mins: 1= Stomach Cramp


Tremor Observation of Outstretched Hands: 2= Slight Tremor Visible


Yawning Observation: 2= >3x During Session


Anxiety or Irritability: 1=Feels Anxious/Irritable


Goose Flesh Skin: 0=Smooth Skin


COWS Score: 10





B[facility] Progress Note (SOAP)


Subjective: 





sweat


tremor


anxiety


restlessness


Objective: 





18 13:53


 Vital Signs











Temperature  97.5 F L  18 10:00


 


Pulse Rate  61   18 10:00


 


Respiratory Rate  18   18 10:00


 


Blood Pressure  149/77   18 10:00


 


O2 Sat by Pulse Oximetry (%)      








 Laboratory Last Values











WBC  5.6 K/mm3 (4.0-10.0)   03/10/18  08:00    


 


RBC  4.31 M/mm3 (3.60-5.2)   03/10/18  08:00    


 


Hgb  12.1 GM/dL (10.7-15.3)   03/10/18  08:00    


 


Hct  36.7 % (32.4-45.2)   03/10/18  08:00    


 


MCV  85.2 fl (80-96)   03/10/18  08:00    


 


MCH  28.2 pg (25.7-33.7)   03/10/18  08:00    


 


MCHC  33.0 g/dl (32.0-36.0)   03/10/18  08:00    


 


RDW  15.2 % (11.6-15.6)   03/10/18  08:00    


 


Plt Count  233 K/MM3 (134-434)   03/10/18  08:00    


 


MPV  9.3 fl (7.5-11.1)   03/10/18  08:00    


 


Sodium  142 mmol/L (136-145)   03/10/18  08:00    


 


Potassium  3.7 mmol/L (3.5-5.1)   03/10/18  08:00    


 


Chloride  107 mmol/L ()   03/10/18  08:00    


 


Carbon Dioxide  28 mmol/L (21-32)   03/10/18  08:00    


 


Anion Gap  7  (8-16)  L  03/10/18  08:00    


 


BUN  19 mg/dL (7-18)  H  03/10/18  08:00    


 


Creatinine  1.1 mg/dL (0.55-1.02)  H  03/10/18  08:00    


 


Creat Clearance w eGFR  51.96  (>60)   03/10/18  08:00    


 


Random Glucose  131 mg/dL ()  H  03/10/18  08:00    


 


Calcium  8.4 mg/dL (8.5-10.1)  L  03/10/18  08:00    


 


Total Bilirubin  0.2 mg/dL (0.2-1.0)   03/10/18  08:00    


 


AST  37 U/L (15-37)   03/10/18  08:00    


 


ALT  30 U/L (12-78)   03/10/18  08:00    


 


Alkaline Phosphatase  106 U/L ()   03/10/18  08:00    


 


Total Protein  6.6 g/dl (6.4-8.2)   03/10/18  08:00    


 


Albumin  3.3 g/dl (3.4-5.0)  L  03/10/18  08:00    


 


Urine Color  Yellow   18  23:40    


 


Urine Appearance  Cloudy   18  23:40    


 


Urine pH  5.0  (5.0-8.0)   18  23:40    


 


Ur Specific Gravity  1.014  (1.001-1.035)   18  23:40    


 


Urine Protein  Negative  (NEGATIVE)   18  23:40    


 


Urine Glucose (UA)  Negative  (NEGATIVE)   18  23:40    


 


Urine Ketones  Negative  (NEGATIVE)   18  23:40    


 


Urine Blood  Negative  (NEGATIVE)   18  23:40    


 


Urine Nitrite  Negative  (NEGATIVE)   18  23:40    


 


Urine Bilirubin  Negative  (NEGATIVE)   18  23:40    


 


Urine Urobilinogen  2.0 mg/dL (0.2-1.0)  H  18  23:40    


 


Ur Leukocyte Esterase  3+  (NEGATIVE)  H  18  23:40    


 


Urine WBC (Auto)  60 /hpf (3-5)   18  23:40    


 


Urine RBC (Auto)  4 /hpf (0-3)   18  23:40    


 


Ur Epithelial Cells  Few /HPF (FEW)   18  23:40    


 


Urine Bacteria  Moderate /hpf (NONE SEEN)   18  23:40    


 


Urine Mucus  Few   18  23:40    


 


RPR Titer  Nonreactive  (NONREACTIVE)   03/10/18  08:00    








lab noted


 asymptomatic 


Assessment: 





18 13:54


withdrawal sx


Plan: 





continue detox

## 2018-03-12 RX ADMIN — CYCLOBENZAPRINE HYDROCHLORIDE PRN MG: 5 TABLET, FILM COATED ORAL at 10:41

## 2018-03-12 RX ADMIN — HYDROXYZINE PAMOATE PRN MG: 50 CAPSULE ORAL at 02:53

## 2018-03-12 RX ADMIN — ACETAMINOPHEN PRN MG: 325 TABLET ORAL at 06:24

## 2018-03-12 RX ADMIN — ASPIRIN 81 MG SCH MG: 81 TABLET ORAL at 10:39

## 2018-03-12 RX ADMIN — LIDOCAINE SCH PATCH: 50 PATCH TOPICAL at 10:40

## 2018-03-12 RX ADMIN — IBUPROFEN PRN MG: 400 TABLET, FILM COATED ORAL at 02:53

## 2018-03-12 RX ADMIN — HYDROXYZINE PAMOATE PRN MG: 50 CAPSULE ORAL at 14:11

## 2018-03-12 RX ADMIN — DOCUSATE SODIUM SCH MG: 100 CAPSULE, LIQUID FILLED ORAL at 22:27

## 2018-03-12 RX ADMIN — Medication SCH TAB: at 10:39

## 2018-03-12 RX ADMIN — LISINOPRIL SCH MG: 10 TABLET ORAL at 22:27

## 2018-03-12 RX ADMIN — MAGNESIUM HYDROXIDE PRN ML: 400 SUSPENSION ORAL at 15:53

## 2018-03-12 RX ADMIN — Medication SCH MG: at 22:27

## 2018-03-12 RX ADMIN — Medication SCH: at 23:04

## 2018-03-12 RX ADMIN — NICOTINE SCH MG: 14 PATCH, EXTENDED RELEASE TRANSDERMAL at 10:39

## 2018-03-12 RX ADMIN — CYCLOBENZAPRINE HYDROCHLORIDE PRN MG: 5 TABLET, FILM COATED ORAL at 22:27

## 2018-03-12 NOTE — PN
[facility] Progress Note (SOAP)


Subjective: 





general body ache, sweat, tremor, anxiety, restlessness, agitative yawning


Objective: 





03/12/18 10:21


 Vital Signs











Temperature  97.3 F L  03/12/18 06:30


 


Pulse Rate  55 L  03/12/18 06:30


 


Respiratory Rate  18   03/12/18 06:30


 


Blood Pressure  152/76   03/12/18 06:30


 


O2 Sat by Pulse Oximetry (%)      








 Laboratory Last Values











WBC  5.6 K/mm3 (4.0-10.0)   03/10/18  08:00    


 


RBC  4.31 M/mm3 (3.60-5.2)   03/10/18  08:00    


 


Hgb  12.1 GM/dL (10.7-15.3)   03/10/18  08:00    


 


Hct  36.7 % (32.4-45.2)   03/10/18  08:00    


 


MCV  85.2 fl (80-96)   03/10/18  08:00    


 


MCH  28.2 pg (25.7-33.7)   03/10/18  08:00    


 


MCHC  33.0 g/dl (32.0-36.0)   03/10/18  08:00    


 


RDW  15.2 % (11.6-15.6)   03/10/18  08:00    


 


Plt Count  233 K/MM3 (134-434)   03/10/18  08:00    


 


MPV  9.3 fl (7.5-11.1)   03/10/18  08:00    


 


Sodium  142 mmol/L (136-145)   03/10/18  08:00    


 


Potassium  3.7 mmol/L (3.5-5.1)   03/10/18  08:00    


 


Chloride  107 mmol/L ()   03/10/18  08:00    


 


Carbon Dioxide  28 mmol/L (21-32)   03/10/18  08:00    


 


Anion Gap  7  (8-16)  L  03/10/18  08:00    


 


BUN  19 mg/dL (7-18)  H  03/10/18  08:00    


 


Creatinine  1.1 mg/dL (0.55-1.02)  H  03/10/18  08:00    


 


Creat Clearance w eGFR  51.96  (>60)   03/10/18  08:00    


 


Random Glucose  131 mg/dL ()  H  03/10/18  08:00    


 


Calcium  8.4 mg/dL (8.5-10.1)  L  03/10/18  08:00    


 


Total Bilirubin  0.2 mg/dL (0.2-1.0)   03/10/18  08:00    


 


AST  37 U/L (15-37)   03/10/18  08:00    


 


ALT  30 U/L (12-78)   03/10/18  08:00    


 


Alkaline Phosphatase  106 U/L ()   03/10/18  08:00    


 


Total Protein  6.6 g/dl (6.4-8.2)   03/10/18  08:00    


 


Albumin  3.3 g/dl (3.4-5.0)  L  03/10/18  08:00    


 


Urine Color  Yellow   03/09/18  23:40    


 


Urine Appearance  Cloudy   03/09/18  23:40    


 


Urine pH  5.0  (5.0-8.0)   03/09/18  23:40    


 


Ur Specific Gravity  1.014  (1.001-1.035)   03/09/18  23:40    


 


Urine Protein  Negative  (NEGATIVE)   03/09/18  23:40    


 


Urine Glucose (UA)  Negative  (NEGATIVE)   03/09/18  23:40    


 


Urine Ketones  Negative  (NEGATIVE)   03/09/18  23:40    


 


Urine Blood  Negative  (NEGATIVE)   03/09/18  23:40    


 


Urine Nitrite  Negative  (NEGATIVE)   03/09/18  23:40    


 


Urine Bilirubin  Negative  (NEGATIVE)   03/09/18  23:40    


 


Urine Urobilinogen  2.0 mg/dL (0.2-1.0)  H  03/09/18  23:40    


 


Ur Leukocyte Esterase  3+  (NEGATIVE)  H  03/09/18  23:40    


 


Urine WBC (Auto)  60 /hpf (3-5)   03/09/18  23:40    


 


Urine RBC (Auto)  4 /hpf (0-3)   03/09/18  23:40    


 


Ur Epithelial Cells  Few /HPF (FEW)   03/09/18  23:40    


 


Urine Bacteria  Moderate /hpf (NONE SEEN)   03/09/18  23:40    


 


Urine Mucus  Few   03/09/18  23:40    


 


RPR Titer  Nonreactive  (NONREACTIVE)   03/10/18  08:00    








lab noted


 gu asymptomatic


increase oral fluid + personal hygiene


Assessment: 





03/12/18 10:23


withdrawal sx


Plan: 





continue detox

## 2018-03-13 VITALS — SYSTOLIC BLOOD PRESSURE: 152 MMHG | HEART RATE: 60 BPM | DIASTOLIC BLOOD PRESSURE: 92 MMHG

## 2018-03-13 VITALS — TEMPERATURE: 97.7 F

## 2018-03-13 RX ADMIN — IBUPROFEN PRN MG: 400 TABLET, FILM COATED ORAL at 01:56

## 2018-03-13 RX ADMIN — HYDROXYZINE PAMOATE PRN MG: 50 CAPSULE ORAL at 10:24

## 2018-03-13 RX ADMIN — Medication SCH TAB: at 09:08

## 2018-03-13 RX ADMIN — HYDROXYZINE PAMOATE PRN MG: 50 CAPSULE ORAL at 01:07

## 2018-03-13 RX ADMIN — LIDOCAINE SCH: 50 PATCH TOPICAL at 09:48

## 2018-03-13 RX ADMIN — LISINOPRIL SCH MG: 10 TABLET ORAL at 09:08

## 2018-03-13 RX ADMIN — NICOTINE SCH: 14 PATCH, EXTENDED RELEASE TRANSDERMAL at 09:10

## 2018-03-13 RX ADMIN — ASPIRIN 81 MG SCH MG: 81 TABLET ORAL at 09:08

## 2018-03-13 NOTE — DS
[facility] Detox Discharge Summary


Admission Date: 


03/09/18





Discharge Date: 03/13/18





- History


Present History: Alcohol Dependence, Opioid Dependence





- Physical Exam Results


Vital Signs: 


 Vital Signs











Temperature  97.7 F   03/13/18 06:21


 


Pulse Rate  65   03/13/18 07:41


 


Respiratory Rate  18   03/13/18 06:21


 


Blood Pressure  156/95   03/13/18 07:41


 


O2 Sat by Pulse Oximetry (%)      











Pertinent Admission Physical Exam Findings: 





withdrawal sx


 Vital Signs











Temperature  97.7 F   03/13/18 06:21


 


Pulse Rate  65   03/13/18 07:41


 


Respiratory Rate  18   03/13/18 06:21


 


Blood Pressure  156/95   03/13/18 07:41


 


O2 Sat by Pulse Oximetry (%)      








 Laboratory Last Values











WBC  5.6 K/mm3 (4.0-10.0)   03/10/18  08:00    


 


RBC  4.31 M/mm3 (3.60-5.2)   03/10/18  08:00    


 


Hgb  12.1 GM/dL (10.7-15.3)   03/10/18  08:00    


 


Hct  36.7 % (32.4-45.2)   03/10/18  08:00    


 


MCV  85.2 fl (80-96)   03/10/18  08:00    


 


MCH  28.2 pg (25.7-33.7)   03/10/18  08:00    


 


MCHC  33.0 g/dl (32.0-36.0)   03/10/18  08:00    


 


RDW  15.2 % (11.6-15.6)   03/10/18  08:00    


 


Plt Count  233 K/MM3 (134-434)   03/10/18  08:00    


 


MPV  9.3 fl (7.5-11.1)   03/10/18  08:00    


 


Sodium  142 mmol/L (136-145)   03/10/18  08:00    


 


Potassium  3.7 mmol/L (3.5-5.1)   03/10/18  08:00    


 


Chloride  107 mmol/L ()   03/10/18  08:00    


 


Carbon Dioxide  28 mmol/L (21-32)   03/10/18  08:00    


 


Anion Gap  7  (8-16)  L  03/10/18  08:00    


 


BUN  19 mg/dL (7-18)  H  03/10/18  08:00    


 


Creatinine  1.1 mg/dL (0.55-1.02)  H  03/10/18  08:00    


 


Creat Clearance w eGFR  51.96  (>60)   03/10/18  08:00    


 


Random Glucose  131 mg/dL ()  H  03/10/18  08:00    


 


Calcium  8.4 mg/dL (8.5-10.1)  L  03/10/18  08:00    


 


Total Bilirubin  0.2 mg/dL (0.2-1.0)   03/10/18  08:00    


 


AST  37 U/L (15-37)   03/10/18  08:00    


 


ALT  30 U/L (12-78)   03/10/18  08:00    


 


Alkaline Phosphatase  106 U/L ()   03/10/18  08:00    


 


Total Protein  6.6 g/dl (6.4-8.2)   03/10/18  08:00    


 


Albumin  3.3 g/dl (3.4-5.0)  L  03/10/18  08:00    


 


Urine Color  Yellow   03/09/18  23:40    


 


Urine Appearance  Cloudy   03/09/18  23:40    


 


Urine pH  5.0  (5.0-8.0)   03/09/18  23:40    


 


Ur Specific Gravity  1.014  (1.001-1.035)   03/09/18  23:40    


 


Urine Protein  Negative  (NEGATIVE)   03/09/18  23:40    


 


Urine Glucose (UA)  Negative  (NEGATIVE)   03/09/18  23:40    


 


Urine Ketones  Negative  (NEGATIVE)   03/09/18  23:40    


 


Urine Blood  Negative  (NEGATIVE)   03/09/18  23:40    


 


Urine Nitrite  Negative  (NEGATIVE)   03/09/18  23:40    


 


Urine Bilirubin  Negative  (NEGATIVE)   03/09/18  23:40    


 


Urine Urobilinogen  2.0 mg/dL (0.2-1.0)  H  03/09/18  23:40    


 


Ur Leukocyte Esterase  3+  (NEGATIVE)  H  03/09/18  23:40    


 


Urine WBC (Auto)  60 /hpf (3-5)   03/09/18  23:40    


 


Urine RBC (Auto)  4 /hpf (0-3)   03/09/18  23:40    


 


Ur Epithelial Cells  Few /HPF (FEW)   03/09/18  23:40    


 


Urine Bacteria  Moderate /hpf (NONE SEEN)   03/09/18  23:40    


 


Urine Mucus  Few   03/09/18  23:40    


 


RPR Titer  Nonreactive  (NONREACTIVE)   03/10/18  08:00    








lab noted





- Treatment


Hospital Course: Detox Protocol Followed, Detoxed Safely, Responded well, 

Discharged Condition Good, Rehab Referral Accepted


Patient has Accepted a Rehab Referral to: [facility] out patient





- Medication


Discharge Medications: 


Ambulatory Orders





Quetiapine Fumarate [Seroquel -] 400 mg PO BID 05/25/16 


Aspirin [ASA -] 81 mg PO DAILY #30 tab.chew 08/13/16 


Omeprazole 20 mg PO DAILY 01/18/17 


Quetiapine Fumarate [Seroquel -] 400 mg PO HS #30 tab 08/09/17 


Albuterol Sulfate Inhaler - [Ventolin HFA Inhaler -] 2 inh PO Q4H PRN #1 

inhaler 03/13/18 


Clonidine HCl 0.3 mg PO DAILY #14 tablet 03/13/18 


Pravastatin Sodium [Pravachol -] 20 mg PO HS #30 tablet 03/13/18 











- Diagnosis


(1) Opioid dependence with withdrawal


Current Visit: Yes   Status: Acute   





(2) Alcohol dependence with uncomplicated withdrawal


Current Visit: Yes   Status: Acute   





(3) Asthma


Current Visit: Yes   Status: Chronic   


Qualifiers: 


   Asthma severity: mild   Asthma persistence: intermittent   Asthma 

complication type: with status asthmaticus   Qualified Code(s): J45.22 - Mild 

intermittent asthma with status asthmaticus   





(4) GERD (gastroesophageal reflux disease)


Current Visit: Yes   Status: Chronic   


Qualifiers: 


   Esophagitis presence: without esophagitis   Qualified Code(s): K21.9 - Gastro

-esophageal reflux disease without esophagitis   





(5) HTN (hypertension)


Current Visit: Yes   Status: Chronic   


Qualifiers: 


   Hypertension type: essential hypertension   Qualified Code(s): I10 - 

Essential (primary) hypertension   





(6) Hepatitis C


Current Visit: Yes   Status: Chronic   


Qualifiers: 


   Viral hepatitis chronicity: chronic   Hepatic coma status: without hepatic 

coma   Qualified Code(s): B18.2 - Chronic viral hepatitis C   





(7) Hyperlipidemia


Current Visit: Yes   Status: Chronic   


Qualifiers: 


   Hyperlipidemia type: pure hypercholesterolemia   Qualified Code(s): E78.00 - 

Pure hypercholesterolemia, unspecified   





(8) Bipolar II disorder


Current Visit: Yes   Status: Suspected   





- AMA


Did Patient Leave Against Medical Advice: No

## 2018-03-13 NOTE — PN
[facility] Progress Note (SOAP)


Subjective: 





52 years old female admitted for alcohol and opiate detox, patient reports 

feeling better, denies withdrawal sx, alert oriented x 3 look forward to go to 

[facility] outpatient for aftercare, following with primary care 

provider  co-morbidities management, 


Objective: 





03/13/18 08:42


 Vital Signs











Temperature  97.7 F   03/13/18 06:21


 


Pulse Rate  65   03/13/18 07:41


 


Respiratory Rate  18   03/13/18 06:21


 


Blood Pressure  156/95   03/13/18 07:41


 


O2 Sat by Pulse Oximetry (%)      








 Laboratory Last Values











WBC  5.6 K/mm3 (4.0-10.0)   03/10/18  08:00    


 


RBC  4.31 M/mm3 (3.60-5.2)   03/10/18  08:00    


 


Hgb  12.1 GM/dL (10.7-15.3)   03/10/18  08:00    


 


Hct  36.7 % (32.4-45.2)   03/10/18  08:00    


 


MCV  85.2 fl (80-96)   03/10/18  08:00    


 


MCH  28.2 pg (25.7-33.7)   03/10/18  08:00    


 


MCHC  33.0 g/dl (32.0-36.0)   03/10/18  08:00    


 


RDW  15.2 % (11.6-15.6)   03/10/18  08:00    


 


Plt Count  233 K/MM3 (134-434)   03/10/18  08:00    


 


MPV  9.3 fl (7.5-11.1)   03/10/18  08:00    


 


Sodium  142 mmol/L (136-145)   03/10/18  08:00    


 


Potassium  3.7 mmol/L (3.5-5.1)   03/10/18  08:00    


 


Chloride  107 mmol/L ()   03/10/18  08:00    


 


Carbon Dioxide  28 mmol/L (21-32)   03/10/18  08:00    


 


Anion Gap  7  (8-16)  L  03/10/18  08:00    


 


BUN  19 mg/dL (7-18)  H  03/10/18  08:00    


 


Creatinine  1.1 mg/dL (0.55-1.02)  H  03/10/18  08:00    


 


Creat Clearance w eGFR  51.96  (>60)   03/10/18  08:00    


 


Random Glucose  131 mg/dL ()  H  03/10/18  08:00    


 


Calcium  8.4 mg/dL (8.5-10.1)  L  03/10/18  08:00    


 


Total Bilirubin  0.2 mg/dL (0.2-1.0)   03/10/18  08:00    


 


AST  37 U/L (15-37)   03/10/18  08:00    


 


ALT  30 U/L (12-78)   03/10/18  08:00    


 


Alkaline Phosphatase  106 U/L ()   03/10/18  08:00    


 


Total Protein  6.6 g/dl (6.4-8.2)   03/10/18  08:00    


 


Albumin  3.3 g/dl (3.4-5.0)  L  03/10/18  08:00    


 


Urine Color  Yellow   03/09/18  23:40    


 


Urine Appearance  Cloudy   03/09/18  23:40    


 


Urine pH  5.0  (5.0-8.0)   03/09/18  23:40    


 


Ur Specific Gravity  1.014  (1.001-1.035)   03/09/18  23:40    


 


Urine Protein  Negative  (NEGATIVE)   03/09/18  23:40    


 


Urine Glucose (UA)  Negative  (NEGATIVE)   03/09/18  23:40    


 


Urine Ketones  Negative  (NEGATIVE)   03/09/18  23:40    


 


Urine Blood  Negative  (NEGATIVE)   03/09/18  23:40    


 


Urine Nitrite  Negative  (NEGATIVE)   03/09/18  23:40    


 


Urine Bilirubin  Negative  (NEGATIVE)   03/09/18  23:40    


 


Urine Urobilinogen  2.0 mg/dL (0.2-1.0)  H  03/09/18  23:40    


 


Ur Leukocyte Esterase  3+  (NEGATIVE)  H  03/09/18  23:40    


 


Urine WBC (Auto)  60 /hpf (3-5)   03/09/18  23:40    


 


Urine RBC (Auto)  4 /hpf (0-3)   03/09/18  23:40    


 


Ur Epithelial Cells  Few /HPF (FEW)   03/09/18  23:40    


 


Urine Bacteria  Moderate /hpf (NONE SEEN)   03/09/18  23:40    


 


Urine Mucus  Few   03/09/18  23:40    


 


RPR Titer  Nonreactive  (NONREACTIVE)   03/10/18  08:00    








lab noted


 asymptomatic


Assessment: 





03/13/18 08:43


completed alcohol and opiate detox


Plan: 





discharged with transportation to resident


health teaching on smoking cessation and healthy life style